# Patient Record
Sex: FEMALE | URBAN - METROPOLITAN AREA
[De-identification: names, ages, dates, MRNs, and addresses within clinical notes are randomized per-mention and may not be internally consistent; named-entity substitution may affect disease eponyms.]

---

## 2017-02-20 ENCOUNTER — ALLIED HEALTH/NURSE VISIT (OUTPATIENT)
Dept: FAMILY MEDICINE | Facility: OTHER | Age: 37
End: 2017-02-20
Payer: COMMERCIAL

## 2017-02-20 VITALS
DIASTOLIC BLOOD PRESSURE: 64 MMHG | RESPIRATION RATE: 18 BRPM | HEART RATE: 65 BPM | SYSTOLIC BLOOD PRESSURE: 101 MMHG | WEIGHT: 181 LBS | BODY MASS INDEX: 29.09 KG/M2 | HEIGHT: 66 IN

## 2017-02-20 DIAGNOSIS — Z32.01 PREGNANCY TEST PERFORMED, PREGNANCY CONFIRMED: ICD-10-CM

## 2017-02-20 DIAGNOSIS — Z32.00 PREGNANCY EXAMINATION OR TEST, PREGNANCY UNCONFIRMED: Primary | ICD-10-CM

## 2017-02-20 LAB
B-HCG SERPL-ACNC: NORMAL IU/L
BETA HCG QUAL IFA URINE: POSITIVE

## 2017-02-20 PROCEDURE — 84703 CHORIONIC GONADOTROPIN ASSAY: CPT | Performed by: FAMILY MEDICINE

## 2017-02-20 PROCEDURE — 99207 ZZC NO CHARGE NURSE ONLY: CPT

## 2017-02-20 PROCEDURE — 36415 COLL VENOUS BLD VENIPUNCTURE: CPT | Performed by: ADVANCED PRACTICE MIDWIFE

## 2017-02-20 PROCEDURE — 84702 CHORIONIC GONADOTROPIN TEST: CPT | Performed by: ADVANCED PRACTICE MIDWIFE

## 2017-02-20 RX ORDER — PRENATAL VIT/IRON FUM/FOLIC AC 27MG-0.8MG
1 TABLET ORAL DAILY
COMMUNITY

## 2017-02-20 ASSESSMENT — PAIN SCALES - GENERAL: PAINLEVEL: NO PAIN (0)

## 2017-02-20 NOTE — PROGRESS NOTES
Kassie Mayfield is a 37 year old here today for a pregnancy test.  LMP: Patient's last menstrual period was 2016.  Wt: 181 lbs 0 oz.    Symptoms include absence of menses, nausea &/or vomiting and fatigue.    Kassie informed of positive pregnancy test results. PEÑA: 2017    Educational advice given: nutrition, smoking, drugs & alcohol and medications.    Current medications reviewed: Yes    Previous pregnancy history remarkable for: abortions (2).    Plan: follow-up appointment with Noemi Elder  for pre-eryn care, take multivitamin or pre- vitamins and OB Education packet given.    She is to call back if she has any questions or concerns.  She is advised to notify a provider immediately if she experiences any severe cramping or abdominal pain or any vaginal bleeding.    ** Hcg Quant lab draw completed in the office today.  ** Patient has not decided on where she would like to deliver at this time. Discussed MG and PH packets, both packets given.

## 2017-02-20 NOTE — PATIENT INSTRUCTIONS
According to your last menstrual period, you are approximately 8 weeks and 2 days pregnant.  Your estimated due date is 09/30/2017.    To do list:  1. If you have not already started taking a prenatal vitamin, please start today.  2. The first OB visit with Noemi Guevara Jael is scheduled between 10 and 12 weeks: 03/21/2017 at 8am in [place].    To make these appointments, or if you have any questions and would like to speak to your care team, you can call the clinic's main phone number:       [facility]: [phone]       [facility]: [phone]       [facility]: [phone]       [facility]: [phone]       [facility]: [phone]    Please remember, we would like to hear from you if you have any vaginal bleeding or any moderate to severe abdominal pain/cramping. You can call any of the phone numbers above and speak with an RN promptly, even if it is after clinic hours, someone will answer your call.    Thank you for choosing [facility], and Congratulations!

## 2017-02-20 NOTE — MR AVS SNAPSHOT
After Visit Summary   2/20/2017    Kassie Mayfield    MRN: [id number]           Patient Information     Date Of Birth          1980        Visit Information        Provider Department      2/20/2017 8:30 AM NL RN TEAM A, KAREN [facility]        Today's Diagnoses     Pregnancy examination or test, pregnancy unconfirmed    -  1    Pregnancy test performed, pregnancy confirmed          Care Instructions    According to your last menstrual period, you are approximately 8 weeks and 2 days pregnant.  Your estimated due date is 09/30/2017.    To do list:  1. If you have not already started taking a prenatal vitamin, please start today.  2. The first OB visit with Noemikrishna Elder is scheduled between 10 and 12 weeks: 03/21/2017 at 8am in [place].    To make these appointments, or if you have any questions and would like to speak to your care team, you can call the [facility]'s main phone number:       [facility]: [phone]       [facility]: [phone]       [facility]: [phone]       [facility]: [phone]       [facility]: [phone]    Please remember, we would like to hear from you if you have any vaginal bleeding or any moderate to severe abdominal pain/cramping. You can call any of the phone numbers above and speak with an RN promptly, even if it is after clinic hours, someone will answer your call.    Thank you for choosing [facility], and Congratulations!              Follow-ups after your visit        Your next 10 appointments already scheduled     Mar 21, 2017  8:00 AM CDT   New Prenatal with RAJANI Meyers CNM   [facility]    [address] [facility] [id number] [phone]              Who to contact     If you have questions or need follow up information about today's clinic visit or your schedule please contact [facility] directly at [phone].  Normal or  "non-critical lab and imaging results will be communicated to you by MyChart, letter or phone within 4 business days after the clinic has received the results. If you do not hear from us within 7 days, please contact the clinic through WhoisEDIt or phone. If you have a critical or abnormal lab result, we will notify you by phone as soon as possible.  Submit refill requests through "[employer] or call your pharmacy and they will forward the refill request to us. Please allow 3 business days for your refill to be completed.          Additional Information About Your Visit        "[employer] Information     "[employer] lets you send messages to your doctor, view your test results, renew your prescriptions, schedule appointments and more. To sign up, go to [email or web address]/"[employer] . Click on \"Log in\" on the left side of the screen, which will take you to the Welcome page. Then click on \"Sign up Now\" on the right side of the page.     You will be asked to enter the access code listed below, as well as some personal information. Please follow the directions to create your username and password.     Your access code is: [email or web address]  Expires: 2017  9:29 AM     Your access code will  in 90 days. If you need help or a new code, please call your [place] clinic or [phone].        Care EveryWhere ID     This is your Care EveryWhere ID. This could be used by other organizations to access your [facility] medical records  [id number]        Your Vitals Were     Pulse Respirations Height Last Period Breastfeeding? BMI (Body Mass Index)    65 18 5' 5.51\" (1.664 m) 2016 No 29.65 kg/m2       Blood Pressure from Last 3 Encounters:   17 101/64    Weight from Last 3 Encounters:   17 181 lb (82.1 kg)              We Performed the Following     Beta HCG qual IFA urine     HCG, quantitative, pregnancy        Primary Care Provider    None Specified       No primary provider on file.        Thank you!     Thank you for " choosing [facility]  for your care. Our goal is always to provide you with excellent care. Hearing back from our patients is one way we can continue to improve our services. Please take a few minutes to complete the written survey that you may receive in the mail after your visit with us. Thank you!             Your Updated Medication List - Protect others around you: Learn how to safely use, store and throw away your medicines at www.disposemymeds.org.          This list is accurate as of: 2/20/17  9:29 AM.  Always use your most recent med list.                   Brand Name Dispense Instructions for use    prenatal multivitamin  plus iron 27-0.8 MG Tabs per tablet      Take 1 tablet by mouth daily

## 2017-03-01 ENCOUNTER — PRENATAL OFFICE VISIT (OUTPATIENT)
Dept: OBGYN | Facility: OTHER | Age: 37
End: 2017-03-01
Payer: COMMERCIAL

## 2017-03-01 VITALS
WEIGHT: 179.25 LBS | SYSTOLIC BLOOD PRESSURE: 100 MMHG | DIASTOLIC BLOOD PRESSURE: 52 MMHG | HEART RATE: 72 BPM | HEIGHT: 65 IN | BODY MASS INDEX: 29.87 KG/M2

## 2017-03-01 DIAGNOSIS — D57.3 SICKLE CELL TRAIT (H): ICD-10-CM

## 2017-03-01 DIAGNOSIS — Z23 NEED FOR TDAP VACCINATION: ICD-10-CM

## 2017-03-01 DIAGNOSIS — O09.529 SUPERVISION OF HIGH-RISK PREGNANCY OF ELDERLY MULTIGRAVIDA: Primary | ICD-10-CM

## 2017-03-01 LAB
ABO + RH BLD: NORMAL
ABO + RH BLD: NORMAL
ALBUMIN UR-MCNC: NEGATIVE MG/DL
APPEARANCE UR: CLEAR
BILIRUB UR QL STRIP: NEGATIVE
BLD GP AB SCN SERPL QL: NORMAL
BLOOD BANK CMNT PATIENT-IMP: NORMAL
COLOR UR AUTO: YELLOW
ERYTHROCYTE [DISTWIDTH] IN BLOOD BY AUTOMATED COUNT: 15 % (ref 10–15)
GLUCOSE UR STRIP-MCNC: NEGATIVE MG/DL
HCT VFR BLD AUTO: 34.2 % (ref 35–47)
HGB BLD-MCNC: 12.2 G/DL (ref 11.7–15.7)
HGB UR QL STRIP: NEGATIVE
KETONES UR STRIP-MCNC: NEGATIVE MG/DL
LEUKOCYTE ESTERASE UR QL STRIP: NEGATIVE
MCH RBC QN AUTO: 26.5 PG (ref 26.5–33)
MCHC RBC AUTO-ENTMCNC: 35.7 G/DL (ref 31.5–36.5)
MCV RBC AUTO: 74 FL (ref 78–100)
NITRATE UR QL: NEGATIVE
PH UR STRIP: 5.5 PH (ref 5–7)
PLATELET # BLD AUTO: 160 10E9/L (ref 150–450)
RBC # BLD AUTO: 4.61 10E12/L (ref 3.8–5.2)
SP GR UR STRIP: 1.02 (ref 1–1.03)
SPECIMEN EXP DATE BLD: NORMAL
URN SPEC COLLECT METH UR: NORMAL
UROBILINOGEN UR STRIP-ACNC: 0.2 EU/DL (ref 0.2–1)
WBC # BLD AUTO: 7.8 10E9/L (ref 4–11)

## 2017-03-01 PROCEDURE — 85027 COMPLETE CBC AUTOMATED: CPT | Performed by: ADVANCED PRACTICE MIDWIFE

## 2017-03-01 PROCEDURE — 87591 N.GONORRHOEAE DNA AMP PROB: CPT | Performed by: ADVANCED PRACTICE MIDWIFE

## 2017-03-01 PROCEDURE — 86762 RUBELLA ANTIBODY: CPT | Performed by: ADVANCED PRACTICE MIDWIFE

## 2017-03-01 PROCEDURE — 87389 HIV-1 AG W/HIV-1&-2 AB AG IA: CPT | Performed by: ADVANCED PRACTICE MIDWIFE

## 2017-03-01 PROCEDURE — 81003 URINALYSIS AUTO W/O SCOPE: CPT | Performed by: ADVANCED PRACTICE MIDWIFE

## 2017-03-01 PROCEDURE — 36415 COLL VENOUS BLD VENIPUNCTURE: CPT | Performed by: ADVANCED PRACTICE MIDWIFE

## 2017-03-01 PROCEDURE — 99207 ZZC FIRST OB VISIT: CPT | Performed by: ADVANCED PRACTICE MIDWIFE

## 2017-03-01 PROCEDURE — 86900 BLOOD TYPING SEROLOGIC ABO: CPT | Performed by: ADVANCED PRACTICE MIDWIFE

## 2017-03-01 PROCEDURE — 87491 CHLMYD TRACH DNA AMP PROBE: CPT | Performed by: ADVANCED PRACTICE MIDWIFE

## 2017-03-01 PROCEDURE — 87086 URINE CULTURE/COLONY COUNT: CPT | Performed by: ADVANCED PRACTICE MIDWIFE

## 2017-03-01 PROCEDURE — 86901 BLOOD TYPING SEROLOGIC RH(D): CPT | Performed by: ADVANCED PRACTICE MIDWIFE

## 2017-03-01 PROCEDURE — 87340 HEPATITIS B SURFACE AG IA: CPT | Performed by: ADVANCED PRACTICE MIDWIFE

## 2017-03-01 PROCEDURE — 86780 TREPONEMA PALLIDUM: CPT | Performed by: ADVANCED PRACTICE MIDWIFE

## 2017-03-01 PROCEDURE — 86850 RBC ANTIBODY SCREEN: CPT | Performed by: ADVANCED PRACTICE MIDWIFE

## 2017-03-01 NOTE — PROGRESS NOTES
Kassie Mayfield is a 37 year old  who presents to the clinic for an new ob visit.   Estimated Date of Delivery: Sep 30, 2017 is calculated from Patient's last menstrual period was 2016.       She has not had bleeding since her LMP.   She has had mild nausea. Weigh loss has not occurred.     HISTORY  updated and reviewed  Past Medical History   Diagnosis Date     NO ACTIVE PROBLEMS      Past Surgical History   Procedure Laterality Date     Appendectomy       Social History     Social History     Marital status:      Spouse name: N/A     Number of children: N/A     Years of education: N/A     Occupational History     Not on file.     Social History Main Topics     Smoking status: Never Smoker     Smokeless tobacco: Not on file     Alcohol use No     Drug use: No     Sexual activity: Yes     Birth control/ protection: None     Other Topics Concern     Not on file     Social History Narrative     Health Maintenance   Topic Date Due     TETANUS IMMUNIZATION (SYSTEM ASSIGNED)  1997     PAP SCREENING Q3 YR (SYSTEM ASSIGNED)  2000     INFLUENZA VACCINE (SYSTEM ASSIGNED)  2017     Family History   Problem Relation Age of Onset     Hypertension Father      DIABETES No family hx of      CEREBROVASCULAR DISEASE No family hx of      Breast Cancer No family hx of      Colon Cancer No family hx of            REVIEW OF SYSTEMS  C: NEGATIVE for fever, chills  I: NEGATIVE for worrisome rashes, moles or lesions  E: NEGATIVE for vision changes   E/M: NEGATIVE for ear, mouth and throat problems  R: NEGATIVE for significant cough or SOB  B: NEGATIVE for masses, tenderness or discharge  CV: NEGATIVE for chest pain, palpitations   GI: NEGATIVE for nausea, abdominal pain, heartburn, or change in bowel habits  : NEGATIVE for frequency, dysuria, or hematuria  M: NEGATIVE for significant arthralgias or myalgia  N: NEGATIVE for weakness, dizziness or paresthesias or headache  E: NEGATIVE for temperature  "intolerance, skin/hair changes  H: NEGATIVE for bleeding problems  P: NEGATIVE for changes in mood or affect        PHYSICAL EXAM  /52 (BP Location: Right arm, Patient Position: Chair, Cuff Size: Adult Large)  Pulse 72  Ht 5' 4.5\" (1.638 m)  Wt 179 lb 4 oz (81.3 kg)  LMP 2016  BMI 30.29 kg/m2  GENERAL:  Pleasant pregnant female, alert, cooperative  and well groomed.  SKIN:  Warm and dry, without lesions or rashes  HEAD: Symmetrical features.  EYES:  PERRLA.  EARS: Tympanic membranes gray, translucent and intact.  MOUTH:  Buccal mucosa pink, moist without lesions.  Teeth in good repair.    NECK:  Thyroid without enlargement and nodules.  Lymph nodes not palpable.   LUNGS:  Clear to auscultation.  BREAST:  Symmetrical.  No dominant, fixed or suspicious masses are noted.  No skin or nipple changes or axillary nodes.    Nipples everted.      HEART:  RRR with  out murmur.  ABDOMEN: Soft without masses , tenderness or organomegaly.  No CVA tenderness.  Uterus palpable at size greater than dates.    MUSCULOSKELETAL:  Full range of motion  GENITALIA: EGBUS normal. Vulva reveals no erythema or lesions.       VAGINA:  pink, normal ruga and discharge, no lesions.        EXTREMITIES:  No edema. No significant varicosities.  Bed side ultrasound at 11 w 1 d    ASSESSMENT:  Intrauterine pregnancy of 9w4d  (O09.529) Supervision of high-risk pregnancy of elderly multigravida  (primary encounter diagnosis)  Comment:   Plan: UA without Microscopic, Urine Culture Aerobic         Bacterial, CBC with platelets, ABO/Rh type and         screen, HIV Antigen Antibody Combo, Rubella         Antibody IgG Quantitative, Hepatitis B surface         antigen, Anti Treponema, Chlamydia trachomatis         PCR, Neisseria gonorrhoeae PCR, US OB < 14         Weeks Single              PLAN:  Options for  testing for chromosomal and birth defects were discussed with the patient. Diagnostic tests include CVS and Amniocentesis. We " discussed that these tests are definitive but invasive and do carry a risk of fetal loss.   Screening tests include nuchal translucency/blood marker testing in the first trimester and quad screening in the second trimester. We discussed that these are screening tests and not diagnostic tests and that false positives and negatives are a distinct possibility.  Declines  testing.    Instructed on best evidence for: weight gain for her weight and height for pregnancy; healthy diet and foods to avoid; exercise and activity during pregnancy;avoiding exposure to toxoplasmosis; and maintenance of a generally healthy lifestyle.     Intended hospital for birth is [facility].  Reviewed transmission of and avoidance strategies for CMV.    Discussed travel cautions.    She had her parnter  have   not traveled to areas currently infected with zika in the past 6 months and currently have  no plans to travel to an area infected with Zika.   If travel plans change they will inform us.        Genetic Screening  At the time of birth, will you be 35 years old or older?  Yes:   Has the patient, baby s father, or anyone in either family had:  Thalassemia (Italian, Greek, Mediterranean, or  background only) and an MCV result less than 80?  No  Neural tube defect such as meningomyelocele, spina bifida or anencephaly? No  Congenital heart defect? No  Down s syndrome?  No  Nic-Sach s disease (Mandaen, Cajun, Cymro-Granbury)? No  Sickle cell disease or trait (Ruth)?  No  Muscular dystrophy?  No  Cystic Fibrosis?  No  Thalia s chorea? No  Mental retardation/autism?  No   If yes, was the person tested for fragile X?  No  Any other inherited genetic or chromosomal disorder? No  Maternal metabolic disorder (e.g. insulin-dependent diabetes, PKU)?  No  A child with birth defects not listed above? No  Recurrent pregnancy loss or a stillbirth?  No  Does the patient or baby s father have any other genetic risks? No  Infection History  Do  we have your permission to complete routine prenatal lab tests.  This includes Hepatitis B, HIV? Yes:   Do you feel that you are at high risk for coming in contact with the AIDS virus? No  Have you ever been treated for tuberculosis?  No  Have you ever received the BCG vaccine for tuberculosis? No  Do you live with someone who has tuberculosis? No   Have you ever been exposed to tuberculosis?  No  Do you have genital herpes?  No  Does your partner have genital herpes?  No  Have you had a rash or viral illness since your last period?  No  Have you ever had Gonorrhea, Chlamydia, Syphilis, venereal warts, trichomoniasis, pelvic inflammatory disease or any other sexually transmitted disease?  No  Have you had chicken pox?  Yes:   Have you been vaccinated against chicken pox?  No  Have you had any other infectious disease?  No

## 2017-03-01 NOTE — NURSING NOTE
"Chief Complaint   Patient presents with     Prenatal Care       Initial /52 (BP Location: Right arm, Patient Position: Chair, Cuff Size: Adult Large)  Pulse 72  Ht 5' 4.5\" (1.638 m)  Wt 179 lb 4 oz (81.3 kg)  LMP 12/24/2016  BMI 30.29 kg/m2 Estimated body mass index is 30.29 kg/(m^2) as calculated from the following:    Height as of this encounter: 5' 4.5\" (1.638 m).    Weight as of this encounter: 179 lb 4 oz (81.3 kg).  Medication Reconciliation: complete   Kayla Oneal CMA      "

## 2017-03-01 NOTE — PATIENT INSTRUCTIONS
Thank for choosing our clinic for your health care needs. We aim to provided you with excellent care. One way that we continue to improve our care is by listening to our patients. Please take a few minutes to complete the written survey that you may receive in the mail after your visit.     You may reach your care team by calling the following numbers:    [place]- [phone]   For clinic hours at [facility]  please visit the [facility] web site [email or web address]    Notification of your lab results:  Normal or non-critical lab and imaging results will be communicated to you by Mychart, letter, or phone within 7 days. If you do not hear from us within 10 days, please contact us through Mychart or phone. If you have a critical or abnormal lab result, we will notify you by phone as soon as possible.      After Hours nurse advice:  [facility] Advisors:  [phone]     Medication Refills:  Please contact your pharmacy and they will forward the refill to us. Please allow 3 business days for your refills to be completed.     Secure access to your medical record:  Use Innolight (secure email communication and access to your chart) to send your primary care provider a message or make an appointment. Ask someone on your Team how to sign up for Innolight. To log on to Validic or for more information in Innolight please visit the website at [email or web address].      OBGYN Care Team               Janis Keil Day [facility]   Clinic hours 8-5 Tuesday and Wednesday Thursdays 8 AM to 7PM    Diamond Hill DO  Clinic hours 7-6 Monday at [place]  8-5 Tuesdays [facility]  7-12 Fridays [facility]  1-5 Fridays at [place]    Jeannette Uriarte MD  Clinic hours: [place] Monday and Thursday 8:15-5  [place] Tuesday 12-5 and Friday 8-5

## 2017-03-01 NOTE — MR AVS SNAPSHOT
After Visit Summary   3/1/2017    Kassie Mayfield    MRN: [id number]           Patient Information     Date Of Birth          1980        Visit Information        Provider Department      3/1/2017 8:00 AM Noemi Buitrago APRN CNM [facility]        Today's Diagnoses     Supervision of high-risk pregnancy of elderly multigravida    -  1      Care Instructions    Thank for choosing our clinic for your health care needs. We aim to provided you with excellent care. One way that we continue to improve our care is by listening to our patients. Please take a few minutes to complete the written survey that you may receive in the mail after your visit.     You may reach your care team by calling the following numbers:    [place]- [phone]   For clinic hours at [facility]  please visit the [facility] web site [email or web address]    Notification of your lab results:  Normal or non-critical lab and imaging results will be communicated to you by Mychart, letter, or phone within 7 days. If you do not hear from us within 10 days, please contact us through Cellwitchhart or phone. If you have a critical or abnormal lab result, we will notify you by phone as soon as possible.      After Hours nurse advice:  [facility] Nurse Advisors:  [phone]     Medication Refills:  Please contact your pharmacy and they will forward the refill to us. Please allow 3 business days for your refills to be completed.     Secure access to your medical record:  Use Cellwitchhart (secure email communication and access to your chart) to send your primary care provider a message or make an appointment. Ask someone on your Team how to sign up for Landingit. To log on to Labtiva or for more information in GTxcel please visit the website at [email or web address].      OBGYN Care Team               Noemi Elder CNM   Clinic hours 8-5 Tuesday and Wednesday Thursdays 8 AM to 7PM    Diamond Hill DO  Clinic hours 7-6  "Monday at [place]  8-5  at [place]  7-12  [facility]  1-5  at [place]    Jeannette Uriarte MD  Clinic hours: [place] Monday and Thursday 8:15-5  [place] Tuesday 12-5 and Friday 8-5                                  Follow-ups after your visit        Future tests that were ordered for you today     Open Future Orders        Priority Expected Expires Ordered    US OB < 14 Weeks Single Routine  2017 3/1/2017            Who to contact     If you have questions or need follow up information about today's clinic visit or your schedule please contact [facility] directly at [phone].  Normal or non-critical lab and imaging results will be communicated to you by MyChart, letter or phone within 4 business days after the clinic has received the results. If you do not hear from us within 7 days, please contact the clinic through Celltick Technologieshart or phone. If you have a critical or abnormal lab result, we will notify you by phone as soon as possible.  Submit refill requests through BayRu or call your pharmacy and they will forward the refill request to us. Please allow 3 business days for your refill to be completed.          Additional Information About Your Visit        [employer] Information     BayRu lets you send messages to your doctor, view your test results, renew your prescriptions, schedule appointments and more. To sign up, go to [email or web address] . Click on \"Log in\" on the left side of the screen, which will take you to the Welcome page. Then click on \"Sign up Now\" on the right side of the page.     You will be asked to enter the access code listed below, as well as some personal information. Please follow the directions to create your username and password.     Your access code is: [email or web address]  Expires: 2017  9:29 AM     Your access code will  in 90 days. If you need help or a new code, please call your [facility] or [phone].        Care " "EveryWhere ID     This is your Care EveryWhere ID. This could be used by other organizations to access your [facility] medical records  [id number]        Your Vitals Were     Pulse Height Last Period BMI (Body Mass Index)          72 5' 4.5\" (1.638 m) 12/24/2016 30.29 kg/m2         Blood Pressure from Last 3 Encounters:   03/01/17 100/52   02/20/17 101/64    Weight from Last 3 Encounters:   03/01/17 179 lb 4 oz (81.3 kg)   02/20/17 181 lb (82.1 kg)              We Performed the Following     ABO/Rh type and screen     Anti Treponema     CBC with platelets     Chlamydia trachomatis PCR     Hepatitis B surface antigen     HIV Antigen Antibody Combo     Neisseria gonorrhoeae PCR     Rubella Antibody IgG Quantitative     UA without Microscopic     Urine Culture Aerobic Bacterial        Primary Care Provider    None Specified       No primary provider on file.        Thank you!     Thank you for choosing [facility]  for your care. Our goal is always to provide you with excellent care. Hearing back from our patients is one way we can continue to improve our services. Please take a few minutes to complete the written survey that you may receive in the mail after your visit with us. Thank you!             Your Updated Medication List - Protect others around you: Learn how to safely use, store and throw away your medicines at www.disposemymeds.org.          This list is accurate as of: 3/1/17  8:58 AM.  Always use your most recent med list.                   Brand Name Dispense Instructions for use    prenatal multivitamin  plus iron 27-0.8 MG Tabs per tablet      Take 1 tablet by mouth daily         "

## 2017-03-02 LAB
C TRACH DNA SPEC QL NAA+PROBE: NORMAL
HBV SURFACE AG SERPL QL IA: NONREACTIVE
HIV 1+2 AB+HIV1 P24 AG SERPL QL IA: NORMAL
N GONORRHOEA DNA SPEC QL NAA+PROBE: NORMAL
RUBV IGG SERPL IA-ACNC: 54 IU/ML
SPECIMEN SOURCE: NORMAL
SPECIMEN SOURCE: NORMAL
T PALLIDUM IGG+IGM SER QL: NEGATIVE

## 2017-03-02 ASSESSMENT — PATIENT HEALTH QUESTIONNAIRE - PHQ9: SUM OF ALL RESPONSES TO PHQ QUESTIONS 1-9: 2

## 2017-03-03 LAB
BACTERIA SPEC CULT: NORMAL
MICRO REPORT STATUS: NORMAL
SPECIMEN SOURCE: NORMAL

## 2017-03-08 ENCOUNTER — RADIANT APPOINTMENT (OUTPATIENT)
Dept: ULTRASOUND IMAGING | Facility: OTHER | Age: 37
End: 2017-03-08
Attending: ADVANCED PRACTICE MIDWIFE
Payer: COMMERCIAL

## 2017-03-08 DIAGNOSIS — O09.529 SUPERVISION OF HIGH-RISK PREGNANCY OF ELDERLY MULTIGRAVIDA: ICD-10-CM

## 2017-03-08 PROCEDURE — 76801 OB US < 14 WKS SINGLE FETUS: CPT

## 2017-03-09 PROBLEM — O44.00 PLACENTA PREVIA WITHOUT HEMORRHAGE, ANTEPARTUM: Status: ACTIVE | Noted: 2017-03-09

## 2017-03-29 ENCOUNTER — PRENATAL OFFICE VISIT (OUTPATIENT)
Dept: OBGYN | Facility: OTHER | Age: 37
End: 2017-03-29
Payer: COMMERCIAL

## 2017-03-29 VITALS
DIASTOLIC BLOOD PRESSURE: 52 MMHG | BODY MASS INDEX: 31.05 KG/M2 | HEART RATE: 68 BPM | SYSTOLIC BLOOD PRESSURE: 100 MMHG | WEIGHT: 183.75 LBS

## 2017-03-29 DIAGNOSIS — O09.529 SUPERVISION OF HIGH-RISK PREGNANCY OF ELDERLY MULTIGRAVIDA: Primary | ICD-10-CM

## 2017-03-29 PROBLEM — Z23 NEED FOR TDAP VACCINATION: Status: ACTIVE | Noted: 2017-03-29

## 2017-03-29 PROCEDURE — 99207 ZZC PRENATAL VISIT: CPT | Performed by: ADVANCED PRACTICE MIDWIFE

## 2017-03-29 NOTE — PATIENT INSTRUCTIONS
Thank for choosing our clinic for your health care needs. We aim to provided you with excellent care. One way that we continue to improve our care is by listening to our patients. Please take a few minutes to complete the written survey that you may receive in the mail after your visit.     You may reach your care team by calling the following numbers:    [place]- [phone]   For clinic hours at [facility]  please visit the [facility] web site [email or web address]    Notification of your lab results:  Normal or non-critical lab and imaging results will be communicated to you by Mychart, letter, or phone within 7 days. If you do not hear from us within 10 days, please contact us through Mychart or phone. If you have a critical or abnormal lab result, we will notify you by phone as soon as possible.      After Hours nurse advice:  [facility] Nurse Advisors:  [phone]     Medication Refills:  Please contact your pharmacy and they will forward the refill to us. Please allow 3 business days for your refills to be completed.     Secure access to your medical record:  Use Hometapper (secure email communication and access to your chart) to send your primary care provider a message or make an appointment. Ask someone on your Team how to sign up for Hometapper. To log on to Jaypore or for more information in Hometapper please visit the website at [email or web address].      OBGYN Care Team               Janis Keil Day [facility]   Clinic hours 8-5 Tuesday and Wednesday Thursdays 8 AM to 7PM    Diamond Hill DO  Clinic hours 7-6 Monday at [place]  8-5 Tuesdays [facility]  7-12 Fridays [facility]  1-5 Fridays at [place]    Jeannette Uriarte MD  Clinic hours: [place] Monday and Thursday 8:15-5  [place] Tuesday 12-5 and Friday 8-5

## 2017-03-29 NOTE — PROGRESS NOTES
Feeling well.  Other than some back pain after mopping.   No bleeding.   Will plan MFM scan next visit.   Declines quad.   No constipation, some continued nausea.   RTC 4 weeks.   RAJANI Garcia, PRAAKSHM

## 2017-03-29 NOTE — NURSING NOTE
"Chief Complaint   Patient presents with     Prenatal Care     low back pain when laying down       Initial /52 (BP Location: Right arm, Patient Position: Chair, Cuff Size: Adult Large)  Pulse 68  Wt 183 lb 12 oz (83.3 kg)  LMP 12/24/2016  BMI 31.05 kg/m2 Estimated body mass index is 31.05 kg/(m^2) as calculated from the following:    Height as of 3/1/17: 5' 4.5\" (1.638 m).    Weight as of this encounter: 183 lb 12 oz (83.3 kg).  Medication Reconciliation: complete   Kayla Oneal CMA      "

## 2017-03-29 NOTE — MR AVS SNAPSHOT
After Visit Summary   3/29/2017    Kassie Mayfield    MRN: [id number]           Patient Information     Date Of Birth          1980        Visit Information        Provider Department      3/29/2017 8:00 AM Noemi Buitrago APRN CNM [facility]        Today's Diagnoses     Supervision of high-risk pregnancy of elderly multigravida    -  1      Care Instructions    Thank for choosing our clinic for your health care needs. We aim to provided you with excellent care. One way that we continue to improve our care is by listening to our patients. Please take a few minutes to complete the written survey that you may receive in the mail after your visit.     You may reach your care team by calling the following numbers:    [place]- [phone]   For clinic hours at [facility]  please visit the [facility] web site [email or web address]    Notification of your lab results:  Normal or non-critical lab and imaging results will be communicated to you by Mychart, letter, or phone within 7 days. If you do not hear from us within 10 days, please contact us through [email or web address] or phone. If you have a critical or abnormal lab result, we will notify you by phone as soon as possible.      After Hours nurse advice:  [facility]:  [phone]     Medication Refills:  Please contact your pharmacy and they will forward the refill to us. Please allow 3 business days for your refills to be completed.     Secure access to your medical record:  Use Peap.[email or web address] (secure email communication and access to your chart) to send your primary care provider a message or make an appointment. Ask someone on your Team how to sign up for PlayMotiont. To log on to MyBeautyCompare or for more information in "[employer] please visit the website at [email or web address].      OBGYN Care Team               Noemi Elder CNM   Clinic hours 8-5 Tuesday and Wednesday Thursdays 8 AM to 7PM    Diamond Hill DO  Clinic hours  7-6 Monday at [place]  8-5 Tuesdays at [place]  7-12 Fridays [facility]  1-5 Fridays at [place]    Jeannette Uriarte MD  Clinic hours: [place] Monday and Thursday 8:15-5  [place] Tuesday 12-5 and Friday 8-5                                  Follow-ups after your visit        Additional Services     PERINATOLOGY REFERRAL       Your provider has referred you to Perinatology Services (please use [facility] Referral if referring to [facility]).    Please be aware that coverage of these services is subject to the terms and limitations of your health insurance plan.  Call member services at your health plan with any benefit or coverage questions.      Please bring the following with you to your appointment:    (1) Any X-Rays, CTs or MRIs which have been performed.  Contact the facility where they were done to arrange for  prior to your scheduled appointment.  Any new CT, MRI or other procedures ordered by your specialist must be performed at a [place] facility or coordinated by your clinic's referral office.    (2) List of current medications   (3) This referral request   (4) Any documents/labs given to you for this referral                  Follow-up notes from your care team     Return in about 1 month (around 4/29/2017).      Who to contact     If you have questions or need follow up information about today's clinic visit or your schedule please contact [facility] directly at [phone].  Normal or non-critical lab and imaging results will be communicated to you by MyChart, letter or phone within 4 business days after the clinic has received the results. If you do not hear from us within 7 days, please contact the clinic through MyChart or phone. If you have a critical or abnormal lab result, we will notify you by phone as soon as possible.  Submit refill requests through [employer] or call your pharmacy and they will forward the refill request to us. Please  "allow 3 business days for your refill to be completed.          Additional Information About Your Visit        MyChart Information     Preclickhart lets you send messages to your doctor, view your test results, renew your prescriptions, schedule appointments and more. To sign up, go to [email or web address] . Click on \"Log in\" on the left side of the screen, which will take you to the Welcome page. Then click on \"Sign up Now\" on the right side of the page.     You will be asked to enter the access code listed below, as well as some personal information. Please follow the directions to create your username and password.     Your access code is: [email or web address]  Expires: 2017 10:29 AM     Your access code will  in 90 days. If you need help or a new code, please call your [place] clinic or [phone].        Care EveryWhere ID     This is your Care EveryWhere ID. This could be used by other organizations to access your [place] medical records  [id number]        Your Vitals Were     Pulse Last Period BMI (Body Mass Index)             68 2016 31.05 kg/m2          Blood Pressure from Last 3 Encounters:   17 100/52   17 100/52   17 101/64    Weight from Last 3 Encounters:   17 183 lb 12 oz (83.3 kg)   17 179 lb 4 oz (81.3 kg)   17 181 lb (82.1 kg)              We Performed the Following     PERINATOLOGY REFERRAL        Primary Care Provider    None       No address on file        Thank you!     Thank you for choosing [facility]  for your care. Our goal is always to provide you with excellent care. Hearing back from our patients is one way we can continue to improve our services. Please take a few minutes to complete the written survey that you may receive in the mail after your visit with us. Thank you!             Your Updated Medication List - Protect others around you: Learn how to safely use, store and throw away your medicines at " www.disposemymeds.org.          This list is accurate as of: 3/29/17  8:33 AM.  Always use your most recent med list.                   Brand Name Dispense Instructions for use    prenatal multivitamin  plus iron 27-0.8 MG Tabs per tablet      Take 1 tablet by mouth daily

## 2017-04-20 ENCOUNTER — MEDICAL CORRESPONDENCE (OUTPATIENT)
Dept: HEALTH INFORMATION MANAGEMENT | Facility: CLINIC | Age: 37
End: 2017-04-20

## 2017-04-21 ENCOUNTER — TELEPHONE (OUTPATIENT)
Dept: OBGYN | Facility: OTHER | Age: 37
End: 2017-04-21

## 2017-04-21 NOTE — TELEPHONE ENCOUNTER
MFM referral refaxed to [place]. Spoke with patient and let her know they will be contacting her in the next several days to set up appointment.  Kayla Oneal CMA

## 2017-04-21 NOTE — TELEPHONE ENCOUNTER
Patient states Noemi Elder told the patient she wants her to have her 20 week ultra sound done at [place] and the patient thought that someone was going to set this up for her but she hasn't heard nothing yet. Please call and let the patient know if she needs to call someone.    Thank you Cathie

## 2017-05-05 ENCOUNTER — TRANSFERRED RECORDS (OUTPATIENT)
Dept: HEALTH INFORMATION MANAGEMENT | Facility: CLINIC | Age: 37
End: 2017-05-05

## 2017-05-23 ENCOUNTER — PRENATAL OFFICE VISIT (OUTPATIENT)
Dept: OBGYN | Facility: OTHER | Age: 37
End: 2017-05-23
Payer: COMMERCIAL

## 2017-05-23 VITALS
BODY MASS INDEX: 31.52 KG/M2 | WEIGHT: 186.5 LBS | SYSTOLIC BLOOD PRESSURE: 108 MMHG | DIASTOLIC BLOOD PRESSURE: 50 MMHG | HEART RATE: 64 BPM

## 2017-05-23 DIAGNOSIS — O09.529 SUPERVISION OF HIGH-RISK PREGNANCY OF ELDERLY MULTIGRAVIDA: Primary | ICD-10-CM

## 2017-05-23 PROBLEM — O44.00 PLACENTA PREVIA WITHOUT HEMORRHAGE, ANTEPARTUM: Status: RESOLVED | Noted: 2017-03-09 | Resolved: 2017-05-23

## 2017-05-23 PROCEDURE — 99207 ZZC PRENATAL VISIT: CPT | Performed by: ADVANCED PRACTICE MIDWIFE

## 2017-05-23 NOTE — MR AVS SNAPSHOT
After Visit Summary   2017    Kassie Mayfield    MRN: [id number]           Patient Information     Date Of Birth          1980        Visit Information        Provider Department      2017 8:00 AM Noemi Buitrago APRN CNM [facility]        Care Instructions    GESTATIONAL DIABETES SCREENING    All pregnant women are screened at least once for diabetes as part of their prenatal care.  A woman has gestational diabetes if she has high blood sugars for the first time during pregnancy.  Diabetes can harm your health and the health of your baby.  But if we find the diabetes early in pregnancy and we watch your health closely, we can prevent problems.   We will check for diabetes between your 24 and 28 week visit.   Please note you can not do this prior to 24 weeks of gestation.    Please schedule this test between 8 AM and 11 AM or 1 PM and 3:45 PM.  On Monday and Thursday you may schedule your appointment up to 5:45PM in [place] and on Monday's in [place] until 5:45 PM    Plan to spend an hour at the clinic.  When you check in let us know that you will be having your diabetes screening that day.   We will give you a sweet drink and one hour later will draw blood from your arm to check your blood sugar.   We will call you to let you know if your results are normal.  If the results are normal no more testing will be needed.  If your results are not normal we will discuss follow up testing with you.    You may eat prior to the testing but it is not recommended to eat or drink very sweet things such as pop, juice, candy or dessert type foods.   If you have any questions please call:  [place] [phone]      SIGNS OF  LABOR    Labor is  if it happens more than three weeks before your due date.    It can be hard to know if you are in labor, since the symptoms can be like the normal feelings of pregnancy.  Often, the only difference is the symptoms increase or  they don't go away.     Signs of  labor can include:    Change in your vaginal discharge:  You will have more vaginal discharge when you are pregnant and it should be creamy white.  Call the clinic right away if your discharge has a foul odor, pink or bloody,  or if it becomes watery or is more than is normal for you during your pregnancy.    More than 5-6 contractions or tightenings per hour.  Contractions can feel like period cramps or bowel (gas or diarrhea) pain.  You will feel it in the lower part of your abdomen, in your back or as a pressure feeling in your bottom.  It is often regular, coming for 30 seconds or a minute and then going away, only to come back 5 or 10 minutes later. Some contractions are normal during pregnancy, but if you are feeling more than 5-6 in one hour, empty your bladder, then drink 16-24 ounces of water, eat a snack and lay down on your left side. Put your hand on your abdomen to count the contractions.  If after one hour of resting you have still had 5-6 contractions call your clinic.    ;          Follow-ups after your visit        Who to contact     If you have questions or need follow up information about today's clinic visit or your schedule please contact [facility] directly at [phone].  Normal or non-critical lab and imaging results will be communicated to you by Tiny Lab Productionshart, letter or phone within 4 business days after the clinic has received the results. If you do not hear from us within 7 days, please contact the clinic through Bioseriet or phone. If you have a critical or abnormal lab result, we will notify you by phone as soon as possible.  Submit refill requests through Splash.FM or call your pharmacy and they will forward the refill request to us. Please allow 3 business days for your refill to be completed.          Additional Information About Your Visit        Splash.FM Information     Splash.FM lets you send messages to your doctor, view your test  "results, renew your prescriptions, schedule appointments and more. To sign up, go to [email or web address] . Click on \"Log in\" on the left side of the screen, which will take you to the Welcome page. Then click on \"Sign up Now\" on the right side of the page.     You will be asked to enter the access code listed below, as well as some personal information. Please follow the directions to create your username and password.     Your access code is: [email or web address]  Expires: 2017  8:09 AM     Your access code will  in 90 days. If you need help or a new code, please call your [place] clinic or [phone].        Care EveryWhere ID     This is your Care EveryWhere ID. This could be used by other organizations to access your [place] medical records  [id number]        Your Vitals Were     Pulse Last Period BMI (Body Mass Index)             64 2016 31.52 kg/m2          Blood Pressure from Last 3 Encounters:   17 108/50   17 100/52   17 100/52    Weight from Last 3 Encounters:   17 186 lb 8 oz (84.6 kg)   17 183 lb 12 oz (83.3 kg)   17 179 lb 4 oz (81.3 kg)              Today, you had the following     No orders found for display       Primary Care Provider    None       No address on file        Thank you!     Thank you for choosing [facility]  for your care. Our goal is always to provide you with excellent care. Hearing back from our patients is one way we can continue to improve our services. Please take a few minutes to complete the written survey that you may receive in the mail after your visit with us. Thank you!             Your Updated Medication List - Protect others around you: Learn how to safely use, store and throw away your medicines at www.disposemymeds.org.          This list is accurate as of: 17  8:13 AM.  Always use your most recent med list.                   Brand Name Dispense Instructions for use    prenatal multivitamin  " plus iron 27-0.8 MG Tabs per tablet      Take 1 tablet by mouth daily

## 2017-05-23 NOTE — PATIENT INSTRUCTIONS
GESTATIONAL DIABETES SCREENING    All pregnant women are screened at least once for diabetes as part of their prenatal care.  A woman has gestational diabetes if she has high blood sugars for the first time during pregnancy.  Diabetes can harm your health and the health of your baby.  But if we find the diabetes early in pregnancy and we watch your health closely, we can prevent problems.   We will check for diabetes between your 24 and 28 week visit.   Please note you can not do this prior to 24 weeks of gestation.    Please schedule this test between 8 AM and 11 AM or 1 PM and 3:45 PM.  On Monday and Thursday you may schedule your appointment up to 5:45PM in [place] and on Monday's in [place] until 5:45 PM    Plan to spend an hour at the clinic.  When you check in let us know that you will be having your diabetes screening that day.   We will give you a sweet drink and one hour later will draw blood from your arm to check your blood sugar.   We will call you to let you know if your results are normal.  If the results are normal no more testing will be needed.  If your results are not normal we will discuss follow up testing with you.    You may eat prior to the testing but it is not recommended to eat or drink very sweet things such as pop, juice, candy or dessert type foods.   If you have any questions please call:  [place] [phone]      SIGNS OF  LABOR    Labor is  if it happens more than three weeks before your due date.    It can be hard to know if you are in labor, since the symptoms can be like the normal feelings of pregnancy.  Often, the only difference is the symptoms increase or they don't go away.     Signs of  labor can include:    Change in your vaginal discharge:  You will have more vaginal discharge when you are pregnant and it should be creamy white.  Call the clinic right away if your discharge has a foul odor, pink or bloody,  or if it becomes watery or is more than  is normal for you during your pregnancy.    More than 5-6 contractions or tightenings per hour.  Contractions can feel like period cramps or bowel (gas or diarrhea) pain.  You will feel it in the lower part of your abdomen, in your back or as a pressure feeling in your bottom.  It is often regular, coming for 30 seconds or a minute and then going away, only to come back 5 or 10 minutes later. Some contractions are normal during pregnancy, but if you are feeling more than 5-6 in one hour, empty your bladder, then drink 16-24 ounces of water, eat a snack and lay down on your left side. Put your hand on your abdomen to count the contractions.  If after one hour of resting you have still had 5-6 contractions call your clinic.    ;

## 2017-05-23 NOTE — PROGRESS NOTES
Feeling well.  No complaints  Completed ultrasound at [facility].  No placenta previa.   Boy.  Will circ.  Peds is [facility].   No contr/LOF/VB  Reviewed GCT and PTL   RTC 4 weeks.  RAJANI Garcia, CNM

## 2017-05-23 NOTE — NURSING NOTE
"Chief Complaint   Patient presents with     Prenatal Care       Initial /50 (BP Location: Right arm, Patient Position: Chair, Cuff Size: Adult Regular)  Pulse 64  Wt 186 lb 8 oz (84.6 kg)  LMP 12/24/2016  BMI 31.52 kg/m2 Estimated body mass index is 31.52 kg/(m^2) as calculated from the following:    Height as of 3/1/17: 5' 4.5\" (1.638 m).    Weight as of this encounter: 186 lb 8 oz (84.6 kg).  Medication Reconciliation: complete   Kayla Oneal CMA      "

## 2017-05-26 ENCOUNTER — TELEPHONE (OUTPATIENT)
Dept: OBGYN | Facility: OTHER | Age: 37
End: 2017-05-26

## 2017-05-26 NOTE — LETTER
[place] [address] [facility] [address]  [phone]      May 30, 2017      Kassie Mayfield  [address] [facility] [address]                To Whom It May Concern:    RE:  Kassie Mayfield a current patient  at our clinic.  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    Please contact me with any questions or concerns at [phone].    Sincerely,         RAJANI Garcia CNM/do

## 2017-05-26 NOTE — TELEPHONE ENCOUNTER
Informed the patient that JKD is not in the office until Tuesday 05/30/2017. Patient is okay with waiting until this time for a letter. Akilah Stephen RN, BSN

## 2017-05-26 NOTE — LETTER
To Whom It May Concern:    RE:  Kassie Mayfield a current patient  at our clinic.  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    Please contact me with any questions or concerns at [phone].    Sincerely,         RAJANI Garcia CNM/do

## 2017-05-30 NOTE — TELEPHONE ENCOUNTER
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  Kayla Oneal CMA

## 2017-05-30 NOTE — TELEPHONE ENCOUNTER
Letter completed and signed by Noemi. Contacted patient so that she can  at [place] . [facility].  Kayla Oneal CMA

## 2017-06-01 ENCOUNTER — TELEPHONE (OUTPATIENT)
Dept: OBGYN | Facility: OTHER | Age: 37
End: 2017-06-01

## 2017-06-01 NOTE — LETTER
[address] [address]  [phone]          June 1, 2017              To Whom it may Concern    Kassie Mayfield has the following work restrictions. 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 No lifting of patients who have fallen even with assist. 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    Sincerely,        Noemi GERARD CNJORDAN/do

## 2017-06-01 NOTE — TELEPHONE ENCOUNTER
Reason for Call:  Other letter    Detailed comments: patient gave her work the letter that was written and they want more information    Phone Number Patient can be reached at: Home number on file [phone] (home) or Cell number on file:    No relevant phone numbers on file.       Best Time: anytime    Can we leave a detailed message on this number? YES    Call taken on 6/1/2017 at 8:13 AM by Cathie Dewitt

## 2017-06-01 NOTE — TELEPHONE ENCOUNTER
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

## 2017-06-01 NOTE — TELEPHONE ENCOUNTER
New letter created with more specific restrictions and job duties. Letter faxed to [phone] and copy placed at  for patient pickup. Notified patient that letter was faxed and is ready to be picked up at [place] .  Kayla Oneal CMA

## 2017-06-01 NOTE — TELEPHONE ENCOUNTER
Contacted patient to gather additional information.  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  The new letter can be faxed to [phone].  Kassie would also like to  a copy at the  later today.  Please contact when completed.

## 2017-06-19 ENCOUNTER — PRENATAL OFFICE VISIT (OUTPATIENT)
Dept: OBGYN | Facility: OTHER | Age: 37
End: 2017-06-19
Payer: COMMERCIAL

## 2017-06-19 VITALS
WEIGHT: 187 LBS | BODY MASS INDEX: 31.6 KG/M2 | HEART RATE: 80 BPM | SYSTOLIC BLOOD PRESSURE: 104 MMHG | DIASTOLIC BLOOD PRESSURE: 50 MMHG

## 2017-06-19 DIAGNOSIS — O09.529 SUPERVISION OF HIGH-RISK PREGNANCY OF ELDERLY MULTIGRAVIDA: ICD-10-CM

## 2017-06-19 LAB
GLUCOSE 1H P 50 G GLC PO SERPL-MCNC: 105 MG/DL (ref 60–129)
HGB BLD-MCNC: 12 G/DL (ref 11.7–15.7)

## 2017-06-19 PROCEDURE — 36415 COLL VENOUS BLD VENIPUNCTURE: CPT | Performed by: ADVANCED PRACTICE MIDWIFE

## 2017-06-19 PROCEDURE — 00000218 ZZHCL STATISTIC OBHBG - HEMOGLOBIN: Performed by: ADVANCED PRACTICE MIDWIFE

## 2017-06-19 PROCEDURE — 82950 GLUCOSE TEST: CPT | Performed by: ADVANCED PRACTICE MIDWIFE

## 2017-06-19 PROCEDURE — 99207 ZZC PRENATAL VISIT: CPT | Performed by: OBSTETRICS & GYNECOLOGY

## 2017-06-19 PROCEDURE — 86780 TREPONEMA PALLIDUM: CPT | Performed by: ADVANCED PRACTICE MIDWIFE

## 2017-06-19 ASSESSMENT — PAIN SCALES - GENERAL: PAINLEVEL: NO PAIN (0)

## 2017-06-19 NOTE — PROGRESS NOTES
37 year old  at 26w6d weeks presents to the clinic for a routine prenatal visit.  No concerns.  No vaginal bleeding, leakage of fluid, or contractions   Good fetal movement.  FHTs= 130's  Fundal height=28cm    Normal anatomy ultrasound with MFM  RTC 2 weeks  GGT today  Blood type:O+    [place]

## 2017-06-19 NOTE — NURSING NOTE
"Chief Complaint   Patient presents with     Prenatal Care     1 hr. GCT       Initial /50  Pulse 80  Wt 187 lb (84.8 kg)  LMP 12/24/2016  BMI 31.6 kg/m2 Estimated body mass index is 31.6 kg/(m^2) as calculated from the following:    Height as of 3/1/17: 5' 4.5\" (1.638 m).    Weight as of this encounter: 187 lb (84.8 kg).  Medication Reconciliation: complete     26w6d    "

## 2017-06-19 NOTE — MR AVS SNAPSHOT
"              After Visit Summary   6/19/2017    Kassie Mayfield    MRN: [id number]           Patient Information     Date Of Birth          1980        Visit Information        Provider Department      6/19/2017 8:45 AM Diamond Hill, DO [facility]        Today's Diagnoses     Supervision of high-risk pregnancy of elderly multigravida          Care Instructions    What to watch out for are: regular contractions every 5 min, vaginal bleeding, decreased fetal movement, or leakage of fluid.  Please call the office or go to L&D if you develop any of these signs and symptoms.      I will see you for your follow up appointment.  Please feel free to call if you have any questions or concerns.      Thanks,  Diamond Hill, DO            Follow-ups after your visit        Follow-up notes from your care team     Return in about 2 weeks (around 7/3/2017).      Who to contact     If you have questions or need follow up information about today's clinic visit or your schedule please contact [facility] directly at [phone].  Normal or non-critical lab and imaging results will be communicated to you by Orbit Min[employer] Limitedhart, letter or phone within 4 business days after the clinic has received the results. If you do not hear from us within 7 days, please contact the clinic through Or[employer] or phone. If you have a critical or abnormal lab result, we will notify you by phone as soon as possible.  Submit refill requests through Atlantia Search or call your pharmacy and they will forward the refill request to us. Please allow 3 business days for your refill to be completed.          Additional Information About Your Visit        Or[employer] Min[employer]hart Information     Atlantia Search lets you send messages to your doctor, view your test results, renew your prescriptions, schedule appointments and more. To sign up, go to [email or web address] . Click on \"Log in\" on the left side of the screen, which will take you to the Welcome " "page. Then click on \"Sign up Now\" on the right side of the page.     You will be asked to enter the access code listed below, as well as some personal information. Please follow the directions to create your username and password.     Your access code is: [email or web address]  Expires: 2017  8:09 AM     Your access code will  in 90 days. If you need help or a new code, please call your [place] clinic or [phone].        Care EveryWhere ID     This is your Care EveryWhere ID. This could be used by other organizations to access your [place] medical records  [id number]        Your Vitals Were     Pulse Last Period BMI (Body Mass Index)             80 2016 31.6 kg/m2          Blood Pressure from Last 3 Encounters:   17 104/50   17 108/50   17 100/52    Weight from Last 3 Encounters:   17 187 lb (84.8 kg)   17 186 lb 8 oz (84.6 kg)   17 183 lb 12 oz (83.3 kg)              We Performed the Following     Anti Treponema     Glucose tolerance gest screen 1 hour     OB hemoglobin        Primary Care Provider    None       No address on file        Thank you!     Thank you for choosing [facility]  for your care. Our goal is always to provide you with excellent care. Hearing back from our patients is one way we can continue to improve our services. Please take a few minutes to complete the written survey that you may receive in the mail after your visit with us. Thank you!             Your Updated Medication List - Protect others around you: Learn how to safely use, store and throw away your medicines at www.disposemymeds.org.          This list is accurate as of: 17  9:42 AM.  Always use your most recent med list.                   Brand Name Dispense Instructions for use    prenatal multivitamin  plus iron 27-0.8 MG Tabs per tablet      Take 1 tablet by mouth daily         "

## 2017-06-19 NOTE — PATIENT INSTRUCTIONS
What to watch out for are: regular contractions every 5 min, vaginal bleeding, decreased fetal movement, or leakage of fluid.  Please call the office or go to L&D if you develop any of these signs and symptoms.      I will see you for your follow up appointment.  Please feel free to call if you have any questions or concerns.      Thanks,  Diamond Hill, DO

## 2017-06-20 LAB — T PALLIDUM IGG+IGM SER QL: NEGATIVE

## 2017-06-30 ENCOUNTER — PRENATAL OFFICE VISIT (OUTPATIENT)
Dept: OBGYN | Facility: OTHER | Age: 37
End: 2017-06-30
Payer: COMMERCIAL

## 2017-06-30 VITALS
SYSTOLIC BLOOD PRESSURE: 100 MMHG | HEART RATE: 64 BPM | BODY MASS INDEX: 31.69 KG/M2 | DIASTOLIC BLOOD PRESSURE: 54 MMHG | WEIGHT: 187.5 LBS

## 2017-06-30 DIAGNOSIS — Z23 NEED FOR TDAP VACCINATION: ICD-10-CM

## 2017-06-30 DIAGNOSIS — O09.529 SUPERVISION OF HIGH-RISK PREGNANCY OF ELDERLY MULTIGRAVIDA: ICD-10-CM

## 2017-06-30 PROCEDURE — 90715 TDAP VACCINE 7 YRS/> IM: CPT | Performed by: ADVANCED PRACTICE MIDWIFE

## 2017-06-30 PROCEDURE — 90471 IMMUNIZATION ADMIN: CPT | Performed by: ADVANCED PRACTICE MIDWIFE

## 2017-06-30 PROCEDURE — 99207 ZZC PRENATAL VISIT: CPT | Performed by: ADVANCED PRACTICE MIDWIFE

## 2017-06-30 NOTE — NURSING NOTE
"Chief Complaint   Patient presents with     Prenatal Care     lower right side back pain       Initial /54 (BP Location: Left arm, Patient Position: Chair, Cuff Size: Adult Large)  Pulse 64  Wt 187 lb 8 oz (85 kg)  LMP 12/24/2016  BMI 31.69 kg/m2 Estimated body mass index is 31.69 kg/(m^2) as calculated from the following:    Height as of 3/1/17: 5' 4.5\" (1.638 m).    Weight as of this encounter: 187 lb 8 oz (85 kg).  Medication Reconciliation: complete   Kayla Oneal CMA      "

## 2017-06-30 NOTE — PATIENT INSTRUCTIONS
Thank for choosing our clinic for your health care needs. We aim to provided you with excellent care. One way that we continue to improve our care is by listening to our patients. Please take a few minutes to complete the written survey that you may receive in the mail after your visit.     You may reach your care team by calling the following numbers:    [place]- [phone]   For clinic hours at [facility]  please visit the [place] web site [email or web address]    Notification of your lab results:  Normal or non-critical lab and imaging results will be communicated to you by Mychart, letter, or phone within 7 days. If you do not hear from us within 10 days, please contact us through Mychart or phone. If you have a critical or abnormal lab result, we will notify you by phone as soon as possible.      After Hours nurse advice:  [facility]:  [phone]     Medication Refills:  Please contact your pharmacy and they will forward the refill to us. Please allow 3 business days for your refills to be completed.     Secure access to your medical record:  Use Silverback Systems (secure email communication and access to your chart) to send your primary care provider a message or make an appointment. Ask someone on your Team how to sign up for [employer] Systems. To log on to OVIVO Mobile Communications or for more information in Silverback Systems please visit the website at [email or web address].      OBGYN Care Team               Janis Keil Day [facility]   Clinic hours: Tuesday 8-5 , Thursday 1145-7 and every other Friday 8-5 at [place]   Wednesday 8-5 at Jose A Hill DO  Clinic hours 7-6 Monday at [place]  8-5 Tuesdays at [place]  7-12 Fridays [facility]  1-5 Fridays at [place]    Jeannette Uriarte MD  Clinic hours: [place] Monday and Thursday 8:15-5  [place] and Friday 8-5

## 2017-06-30 NOTE — MR AVS SNAPSHOT
After Visit Summary   6/30/2017    Kassie Mayfield    MRN: [id number]           Patient Information     Date Of Birth          1980        Visit Information        Provider Department      6/30/2017 8:45 AM Noemi Buitrago APRN CNM [facility]        Today's Diagnoses     Supervision of high-risk pregnancy of elderly multigravida        Need for Tdap vaccination          Care Instructions    Thank for choosing our clinic for your health care needs. We aim to provided you with excellent care. One way that we continue to improve our care is by listening to our patients. Please take a few minutes to complete the written survey that you may receive in the mail after your visit.     You may reach your care team by calling the following numbers:    [place]- [phone]   For clinic hours at [facility]  please visit the [place] web site [email or web address]    Notification of your lab results:  Normal or non-critical lab and imaging results will be communicated to you by Mychart, letter, or phone within 7 days. If you do not hear from us within 10 days, please contact us through ClevrU Corporationhart or phone. If you have a critical or abnormal lab result, we will notify you by phone as soon as possible.      After Hours nurse advice:  [facility]:  [phone]     Medication Refills:  Please contact your pharmacy and they will forward the refill to us. Please allow 3 business days for your refills to be completed.     Secure access to your medical record:  Use ItsPlatonict (secure email communication and access to your chart) to send your primary care provider a message or make an appointment. Ask someone on your Team how to sign up for NetBase Solutions. To log on to Business Engine or for more information in NetBase Solutions please visit the website at [email or web address].      OBGYN Care Team               Noemi Elder CNM   Clinic hours: Tuesday 8-5 , Thursday 1145-7 and every other Friday 8-5  "at [place]   Wednesday 8-5 at Jose A Hill DO  Clinic hours 7-6 Monday at [place]  8-5 Tuesdays at [place]  7-12 Fridays [facility]  1-5 Fridays at [place]    Jeannette Uriarte MD  Clinic hours: [place] Monday and Thursday 8:15-5  [place] and Friday 8-5                                    Follow-ups after your visit        Follow-up notes from your care team     Return in about 2 weeks (around 7/14/2017).      Your next 10 appointments already scheduled     Jul 11, 2017  8:30 AM CDT   ESTABLISHED PRENATAL with RAJANI Meyers [facility]    [address] [facility] [address] [phone]              Who to contact     If you have questions or need follow up information about today's clinic visit or your schedule please contact [facility] directly at [phone].  Normal or non-critical lab and imaging results will be communicated to you by ET Solar Grouphart, letter or phone within 4 business days after the clinic has received the results. If you do not hear from us within 7 days, please contact the clinic through [employer]ouphart or phone. If you have a critical or abnormal lab result, we will notify you by phone as soon as possible.  Submit refill requests through [employer] Display System or call your pharmacy and they will forward the refill request to us. Please allow 3 business days for your refill to be completed.          Additional Information About Your Visit        [employer] Display System Information     [employer] Display System lets you send messages to your doctor, view your test results, renew your prescriptions, schedule appointments and more. To sign up, go to [email or web address] . Click on \"Log in\" on the left side of the screen, which will take you to the Welcome page. Then click on \"Sign up Now\" on the right side of the page.     You will be asked to enter the access code listed below, as well as some personal information. Please follow the directions " to create your username and password.     Your access code is: [email or web address]  Expires: 2017  8:09 AM     Your access code will  in 90 days. If you need help or a new code, please call your [place] clinic or [phone].        Care EveryWhere ID     This is your Care EveryWhere ID. This could be used by other organizations to access your [place] medical records  [id number]        Your Vitals Were     Pulse Last Period BMI (Body Mass Index)             64 2016 31.69 kg/m2          Blood Pressure from Last 3 Encounters:   17 100/54   17 104/50   17 108/50    Weight from Last 3 Encounters:   17 187 lb 8 oz (85 kg)   17 187 lb (84.8 kg)   17 186 lb 8 oz (84.6 kg)              We Performed the Following     INJECTION INTRAMUSCULAR OR SUB-Q     TDAP VACCINE (ADACEL)        Primary Care Provider    None       No address on file        Equal Access to Services     [facility]: Hadii aad ku hadasho Soomaali, waaxda luqadaha, qaybta kaalmada adeegyada, waxay cassiin bhavik garcia . So [facility] [phone].    ATENCIÓN: Si habla español, tiene a puga disposición servicios gratuitos de asistencia lingüística. Llame al [phone].    We comply with applicable federal civil rights laws and [place] laws. We do not discriminate on the basis of race, color, national origin, age, disability sex, sexual orientation or gender identity.            Thank you!     Thank you for choosing [facility]  for your care. Our goal is always to provide you with excellent care. Hearing back from our patients is one way we can continue to improve our services. Please take a few minutes to complete the written survey that you may receive in the mail after your visit with us. Thank you!             Your Updated Medication List - Protect others around you: Learn how to safely use, store and throw away your medicines at www.disposemymeds.org.          This list is  accurate as of: 6/30/17  8:54 AM.  Always use your most recent med list.                   Brand Name Dispense Instructions for use Diagnosis    prenatal multivitamin  plus iron 27-0.8 MG Tabs per tablet      Take 1 tablet by mouth daily

## 2017-06-30 NOTE — PROGRESS NOTES
Feeling well.  No complaints.  No contr/LOF/VB  Some back pain but not consistent or overly bothersome  Discussed relief measures.   Probably condoms for birth control though they want no more children.  Not interested in tubal or hormonal methods.   Will circ and has [facility] for peds.   rtc 2 weeks.   Noemi Elder, RAJANI, CNM

## 2017-07-11 ENCOUNTER — TELEPHONE (OUTPATIENT)
Dept: OBGYN | Facility: OTHER | Age: 37
End: 2017-07-11

## 2017-07-11 ENCOUNTER — PRENATAL OFFICE VISIT (OUTPATIENT)
Dept: OBGYN | Facility: OTHER | Age: 37
End: 2017-07-11
Payer: COMMERCIAL

## 2017-07-11 VITALS
WEIGHT: 187 LBS | HEART RATE: 76 BPM | SYSTOLIC BLOOD PRESSURE: 100 MMHG | DIASTOLIC BLOOD PRESSURE: 54 MMHG | BODY MASS INDEX: 31.6 KG/M2

## 2017-07-11 DIAGNOSIS — O09.529 SUPERVISION OF HIGH-RISK PREGNANCY OF ELDERLY MULTIGRAVIDA: Primary | ICD-10-CM

## 2017-07-11 PROBLEM — Z23 NEED FOR TDAP VACCINATION: Status: RESOLVED | Noted: 2017-03-29 | Resolved: 2017-07-11

## 2017-07-11 PROCEDURE — 99207 ZZC PRENATAL VISIT: CPT | Performed by: ADVANCED PRACTICE MIDWIFE

## 2017-07-11 NOTE — NURSING NOTE
"Chief Complaint   Patient presents with     Prenatal Care       Initial /54 (BP Location: Right arm, Patient Position: Chair, Cuff Size: Adult Regular)  Pulse 76  Wt 187 lb (84.8 kg)  LMP 12/24/2016  BMI 31.6 kg/m2 Estimated body mass index is 31.6 kg/(m^2) as calculated from the following:    Height as of 3/1/17: 5' 4.5\" (1.638 m).    Weight as of this encounter: 187 lb (84.8 kg).  Medication Reconciliation: complete   Kayla Oneal CMA      "

## 2017-07-11 NOTE — MR AVS SNAPSHOT
After Visit Summary   7/11/2017    Kassie Mayfield    MRN: [id number]           Patient Information     Date Of Birth          1980        Visit Information        Provider Department      7/11/2017 8:30 AM Noemi Buitrago APRN CNM [facility]        Today's Diagnoses     Supervision of high-risk pregnancy of elderly multigravida    -  1       Follow-ups after your visit        Follow-up notes from your care team     Return in about 2 weeks (around 7/25/2017).      Your next 10 appointments already scheduled     Jul 25, 2017  8:30 AM CDT   ESTABLISHED PRENATAL with RAJANI Meyers CNM   [facility]    [address] [facility] [id number] [phone]            Aug 08, 2017  8:30 AM CDT   ESTABLISHED PRENATAL with RAJANI Meyers CNM   [facility]    [address] [facility] [id number] [phone]              Who to contact     If you have questions or need follow up information about today's clinic visit or your schedule please contact [facility] directly at [phone].  Normal or non-critical lab and imaging results will be communicated to you by MyChart, letter or phone within 4 business days after the clinic has received the results. If you do not hear from us within 7 days, please contact the clinic through Andean Designshart or phone. If you have a critical or abnormal lab result, we will notify you by phone as soon as possible.  Submit refill requests through The Industry's Alternative or call your pharmacy and they will forward the refill request to us. Please allow 3 business days for your refill to be completed.          Additional Information About Your Visit        MyChart Information     The Industry's Alternative lets you send messages to your doctor, view your test results, renew your prescriptions, schedule appointments and more. To sign up, go to [email or web address] . Click on  "\"Log in\" on the left side of the screen, which will take you to the Welcome page. Then click on \"Sign up Now\" on the right side of the page.     You will be asked to enter the access code listed below, as well as some personal information. Please follow the directions to create your username and password.     Your access code is: [email or web address]  Expires: 2017  8:09 AM     Your access code will  in 90 days. If you need help or a new code, please call your [place] clinic or [phone].        Care EveryWhere ID     This is your Care EveryWhere ID. This could be used by other organizations to access your [place] medical records  [id number]        Your Vitals Were     Pulse Last Period BMI (Body Mass Index)             76 2016 31.6 kg/m2          Blood Pressure from Last 3 Encounters:   17 100/54   17 100/54   17 104/50    Weight from Last 3 Encounters:   17 187 lb (84.8 kg)   17 187 lb 8 oz (85 kg)   17 187 lb (84.8 kg)              Today, you had the following     No orders found for display       Primary Care Provider    None       No address on file        Equal Access to Services     [facility] : Hadii arnulfo ku sophieo Sotomali, waaxda luqadaha, qaybta kaalmada adeegyada, waxay sabiha garcia . So [facility] [phone].    ATENCIÓN: Si habla español, tiene a puga disposición servicios gratuitos de asistencia lingüística. Llame al [phone].    We comply with applicable federal civil rights laws and [place] laws. We do not discriminate on the basis of race, color, national origin, age, disability sex, sexual orientation or gender identity.            Thank you!     Thank you for choosing [facility]  for your care. Our goal is always to provide you with excellent care. Hearing back from our patients is one way we can continue to improve our services. Please take a few minutes to complete the written survey that you may receive " in the mail after your visit with us. Thank you!             Your Updated Medication List - Protect others around you: Learn how to safely use, store and throw away your medicines at www.disposemymeds.org.          This list is accurate as of: 7/11/17  8:53 AM.  Always use your most recent med list.                   Brand Name Dispense Instructions for use Diagnosis    prenatal multivitamin  plus iron 27-0.8 MG Tabs per tablet      Take 1 tablet by mouth daily

## 2017-07-11 NOTE — TELEPHONE ENCOUNTER
Reason for call:  Form  Reason for Call:  Form, our goal is to have forms completed with 72 hours, however, some forms may require a visit or additional information.    Type of letter, form or note:  FMLA    Who is the form from?: Patient    Where did the form come from: Patient or family brought in       What clinic location was the form placed at?: [facility] - [phone]    Where the form was placed: Given to physician    What number is listed as a contact on the form?: [phone]         Additional comments: FMLA    Call taken on 7/11/2017 at 3:37 PM by Aleksandra Zimmerman

## 2017-07-11 NOTE — PROGRESS NOTES
Feeling ok  Some sciatic pain this week.  Thinks it was related to a busy day at work.   No contra/lof/vb  Has FMLA paper work.  Planning on stopping work at 36 weeks.  Understands that this will decrease her time post partum if she is using FMLA.  RTC 2 weeks.   Noemi Elder, APRN, CNM

## 2017-07-25 ENCOUNTER — PRENATAL OFFICE VISIT (OUTPATIENT)
Dept: OBGYN | Facility: OTHER | Age: 37
End: 2017-07-25
Payer: COMMERCIAL

## 2017-07-25 VITALS
HEART RATE: 72 BPM | WEIGHT: 186.5 LBS | BODY MASS INDEX: 31.52 KG/M2 | SYSTOLIC BLOOD PRESSURE: 100 MMHG | DIASTOLIC BLOOD PRESSURE: 50 MMHG

## 2017-07-25 DIAGNOSIS — O09.529 SUPERVISION OF HIGH-RISK PREGNANCY OF ELDERLY MULTIGRAVIDA: Primary | ICD-10-CM

## 2017-07-25 PROCEDURE — 99207 ZZC PRENATAL VISIT: CPT | Performed by: ADVANCED PRACTICE MIDWIFE

## 2017-07-25 NOTE — NURSING NOTE
"Chief Complaint   Patient presents with     Prenatal Care       Initial /50 (BP Location: Left arm, Patient Position: Chair, Cuff Size: Adult Large)  Pulse 72  Wt 186 lb 8 oz (84.6 kg)  LMP 12/24/2016  BMI 31.52 kg/m2 Estimated body mass index is 31.52 kg/(m^2) as calculated from the following:    Height as of 3/1/17: 5' 4.5\" (1.638 m).    Weight as of this encounter: 186 lb 8 oz (84.6 kg).  Medication Reconciliation: complete   Kayla Oneal CMA      "

## 2017-07-25 NOTE — PROGRESS NOTES
Feeling well.  No complaints.  Relates that she feels much better with this pregnancy and attributes it to working through the pregnancy.   Didn't gain a lot of weight with her first pregnancy either.  Denies n/v/d  No contra/LOF/VB  RTC 2 weeks.   Noemi Elder, APRN, CNM

## 2017-07-25 NOTE — MR AVS SNAPSHOT
"              After Visit Summary   7/25/2017    Kassie Mayfield    MRN: [id number]           Patient Information     Date Of Birth          1980        Visit Information        Provider Department      7/25/2017 8:30 AM Noemi Buitrago APRN CNM [facility]        Today's Diagnoses     Supervision of high-risk pregnancy of elderly multigravida    -  1       Follow-ups after your visit        Follow-up notes from your care team     Return in about 2 weeks (around 8/8/2017).      Your next 10 appointments already scheduled     Aug 08, 2017  8:30 AM CDT   ESTABLISHED PRENATAL with RAJANI Meyers CNM   [facility]    [address] [facility] [address] [phone]              Who to contact     If you have questions or need follow up information about today's clinic visit or your schedule please contact [facility] directly at [phone].  Normal or non-critical lab and imaging results will be communicated to you by The Epsilon Projecthart, letter or phone within 4 business days after the clinic has received the results. If you do not hear from us within 7 days, please contact the clinic through Natural Option USAt or phone. If you have a critical or abnormal lab result, we will notify you by phone as soon as possible.  Submit refill requests through Postdeck or call your pharmacy and they will forward the refill request to us. Please allow 3 business days for your refill to be completed.          Additional Information About Your Visit        The Epsilon Projecthart Information     Postdeck lets you send messages to your doctor, view your test results, renew your prescriptions, schedule appointments and more. To sign up, go to [email or web address] . Click on \"Log in\" on the left side of the screen, which will take you to the Welcome page. Then click on \"Sign up Now\" on the right side of the page.     You will be asked to enter the access code listed below, as " well as some personal information. Please follow the directions to create your username and password.     Your access code is: [email or web address]  Expires: 2017  8:09 AM     Your access code will  in 90 days. If you need help or a new code, please call your [place] clinic or [phone].        Care EveryWhere ID     This is your Care EveryWhere ID. This could be used by other organizations to access your [place] medical records  [id number]        Your Vitals Were     Pulse Last Period BMI (Body Mass Index)             72 2016 31.52 kg/m2          Blood Pressure from Last 3 Encounters:   17 100/50   17 100/54   17 100/54    Weight from Last 3 Encounters:   17 186 lb 8 oz (84.6 kg)   17 187 lb (84.8 kg)   17 187 lb 8 oz (85 kg)              Today, you had the following     No orders found for display       Primary Care Provider    None       No address on file        Equal Access to Services     [facility]: Hadii arnulfo ku sophieo Sogeraldine, waaxda luqadaha, qaybta kaalmada adeyudith, la garcia . So [facility] [phone].    ATENCIÓN: Si habla español, tiene a puga disposición servicios gratuitos de asistencia lingüística. Llame al [phone].    We comply with applicable federal civil rights laws and [place] laws. We do not discriminate on the basis of race, color, national origin, age, disability sex, sexual orientation or gender identity.            Thank you!     Thank you for choosing [facility]  for your care. Our goal is always to provide you with excellent care. Hearing back from our patients is one way we can continue to improve our services. Please take a few minutes to complete the written survey that you may receive in the mail after your visit with us. Thank you!             Your Updated Medication List - Protect others around you: Learn how to safely use, store and throw away your medicines at  www.disposemymeds.org.          This list is accurate as of: 7/25/17  9:35 AM.  Always use your most recent med list.                   Brand Name Dispense Instructions for use Diagnosis    prenatal multivitamin  plus iron 27-0.8 MG Tabs per tablet      Take 1 tablet by mouth daily

## 2017-08-08 ENCOUNTER — PRENATAL OFFICE VISIT (OUTPATIENT)
Dept: OBGYN | Facility: OTHER | Age: 37
End: 2017-08-08
Payer: COMMERCIAL

## 2017-08-08 VITALS
SYSTOLIC BLOOD PRESSURE: 97 MMHG | BODY MASS INDEX: 31.26 KG/M2 | HEART RATE: 91 BPM | WEIGHT: 185 LBS | DIASTOLIC BLOOD PRESSURE: 58 MMHG

## 2017-08-08 DIAGNOSIS — O09.529 SUPERVISION OF HIGH-RISK PREGNANCY OF ELDERLY MULTIGRAVIDA: Primary | ICD-10-CM

## 2017-08-08 PROCEDURE — 99207 ZZC PRENATAL VISIT: CPT | Performed by: ADVANCED PRACTICE MIDWIFE

## 2017-08-08 ASSESSMENT — PAIN SCALES - GENERAL: PAINLEVEL: NO PAIN (0)

## 2017-08-08 NOTE — MR AVS SNAPSHOT
"              After Visit Summary   2017    Kassie Mayfield    MRN: [id number]           Patient Information     Date Of Birth          1980        Visit Information        Provider Department      2017 8:30 AM Noemi Buitrago APRN CNM [facility]        Today's Diagnoses     Supervision of high-risk pregnancy of elderly multigravida    -  1       Follow-ups after your visit        Follow-up notes from your care team     Return in about 2 weeks (around 2017).      Who to contact     If you have questions or need follow up information about today's clinic visit or your schedule please contact [facility] directly at [phone].  Normal or non-critical lab and imaging results will be communicated to you by MyChart, letter or phone within 4 business days after the clinic has received the results. If you do not hear from us within 7 days, please contact the clinic through IndigoBoomhart or phone. If you have a critical or abnormal lab result, we will notify you by phone as soon as possible.  Submit refill requests through Tucoola or call your pharmacy and they will forward the refill request to us. Please allow 3 business days for your refill to be completed.          Additional Information About Your Visit        MyChart Information     Tucoola lets you send messages to your doctor, view your test results, renew your prescriptions, schedule appointments and more. To sign up, go to [email or web address] . Click on \"Log in\" on the left side of the screen, which will take you to the Welcome page. Then click on \"Sign up Now\" on the right side of the page.     You will be asked to enter the access code listed below, as well as some personal information. Please follow the directions to create your username and password.     Your access code is: [email or web address]  Expires: 2017  8:09 AM     Your access code will  in 90 days. If you need help or a new code, please call your " [facility] or [phone].        Care EveryWhere ID     This is your Care EveryWhere ID. This could be used by other organizations to access your [place] medical records  [id number]        Your Vitals Were     Pulse Last Period BMI (Body Mass Index)             91 12/24/2016 31.26 kg/m2          Blood Pressure from Last 3 Encounters:   08/08/17 97/58   07/25/17 100/50   07/11/17 100/54    Weight from Last 3 Encounters:   08/08/17 185 lb (83.9 kg)   07/25/17 186 lb 8 oz (84.6 kg)   07/11/17 187 lb (84.8 kg)              Today, you had the following     No orders found for display       Primary Care Provider    None       No address on file        Equal Access to Services     [facility] : Hadii arnulfo barrioso El, waaxda luqadaha, qaybta kaalmada adeegyamartina, la garcia . So [facility] [phone].    ATENCIÓN: Si habla español, tiene a puga disposición servicios gratuitos de asistencia lingüística. Llame al [phone].    We comply with applicable federal civil rights laws and [place] laws. We do not discriminate on the basis of race, color, national origin, age, disability sex, sexual orientation or gender identity.            Thank you!     Thank you for choosing [facility]  for your care. Our goal is always to provide you with excellent care. Hearing back from our patients is one way we can continue to improve our services. Please take a few minutes to complete the written survey that you may receive in the mail after your visit with us. Thank you!             Your Updated Medication List - Protect others around you: Learn how to safely use, store and throw away your medicines at www.disposemymeds.org.          This list is accurate as of: 8/8/17  8:54 AM.  Always use your most recent med list.                   Brand Name Dispense Instructions for use Diagnosis    prenatal multivitamin plus iron 27-0.8 MG Tabs per tablet      Take 1 tablet by mouth daily

## 2017-08-08 NOTE — PROGRESS NOTES
Feeling well. Had some left sided pain yesterday that she thinks was probably gas.   Has peds, will use condoms and BF.   Continues to deny n/v/d  No contra/LOF/VB  Plan GBS next visit. In two weeks  Noemi Elder, RAJANI, PRAKASHM

## 2017-08-22 ENCOUNTER — PRENATAL OFFICE VISIT (OUTPATIENT)
Dept: OBGYN | Facility: OTHER | Age: 37
End: 2017-08-22
Payer: COMMERCIAL

## 2017-08-22 VITALS
BODY MASS INDEX: 31.18 KG/M2 | HEART RATE: 68 BPM | WEIGHT: 184.5 LBS | SYSTOLIC BLOOD PRESSURE: 102 MMHG | DIASTOLIC BLOOD PRESSURE: 54 MMHG

## 2017-08-22 DIAGNOSIS — O09.529 SUPERVISION OF HIGH-RISK PREGNANCY OF ELDERLY MULTIGRAVIDA: Primary | ICD-10-CM

## 2017-08-22 PROCEDURE — 87653 STREP B DNA AMP PROBE: CPT | Performed by: ADVANCED PRACTICE MIDWIFE

## 2017-08-22 PROCEDURE — 99207 ZZC PRENATAL VISIT: CPT | Performed by: ADVANCED PRACTICE MIDWIFE

## 2017-08-22 NOTE — PROGRESS NOTES
Feeling well.  No complaints.   Works for another week.   GBS today.   Reports not having an appetite.   Labor instructions reviewed.   No contra/lof/vb    RTC 1 week,  Noemi Elder, RAJANI, CNM

## 2017-08-22 NOTE — NURSING NOTE
"Chief Complaint   Patient presents with     Prenatal Care       Initial /54 (BP Location: Left arm, Patient Position: Chair, Cuff Size: Adult Regular)  Pulse 68  Wt 184 lb 8 oz (83.7 kg)  LMP 12/24/2016  BMI 31.18 kg/m2 Estimated body mass index is 31.18 kg/(m^2) as calculated from the following:    Height as of 3/1/17: 5' 4.5\" (1.638 m).    Weight as of this encounter: 184 lb 8 oz (83.7 kg).  Medication Reconciliation: complete   Kayla Oneal CMA      "

## 2017-08-22 NOTE — MR AVS SNAPSHOT
"              After Visit Summary   2017    Kassie Mayfield    MRN: [id number]           Patient Information     Date Of Birth          1980        Visit Information        Provider Department      2017 8:30 AM Noemi Buitrago APRN CNM [facility]        Today's Diagnoses     Supervision of high-risk pregnancy of elderly multigravida    -  1       Follow-ups after your visit        Follow-up notes from your care team     Return in about 1 week (around 2017).      Who to contact     If you have questions or need follow up information about today's clinic visit or your schedule please contact [facility] directly at [phone].  Normal or non-critical lab and imaging results will be communicated to you by MyChart, letter or phone within 4 business days after the clinic has received the results. If you do not hear from us within 7 days, please contact the clinic through Maginaticshart or phone. If you have a critical or abnormal lab result, we will notify you by phone as soon as possible.  Submit refill requests through Mozilla or call your pharmacy and they will forward the refill request to us. Please allow 3 business days for your refill to be completed.          Additional Information About Your Visit        MyChart Information     Mozilla lets you send messages to your doctor, view your test results, renew your prescriptions, schedule appointments and more. To sign up, go to [email or web address] . Click on \"Log in\" on the left side of the screen, which will take you to the Welcome page. Then click on \"Sign up Now\" on the right side of the page.     You will be asked to enter the access code listed below, as well as some personal information. Please follow the directions to create your username and password.     Your access code is: [email or web address]  Expires: 2017 11:00 AM     Your access code will  in 90 days. If you need help or a new code, please call " your [place] clinic or [phone].        Care EveryWhere ID     This is your Care EveryWhere ID. This could be used by other organizations to access your [place] medical records  [id number]        Your Vitals Were     Pulse Last Period BMI (Body Mass Index)             68 12/24/2016 31.18 kg/m2          Blood Pressure from Last 3 Encounters:   08/22/17 102/54   08/08/17 97/58   07/25/17 100/50    Weight from Last 3 Encounters:   08/22/17 184 lb 8 oz (83.7 kg)   08/08/17 185 lb (83.9 kg)   07/25/17 186 lb 8 oz (84.6 kg)              We Performed the Following     Strep, Group B by PCR        Primary Care Provider    None       No address on file        Equal Access to Services     [facility] : Tatiana Gallegos, waaxda luqadaha, qaybta kaalmada desmond, la garcia . So [facility] [phone].    ATENCIÓN: Si habla español, tiene a puga disposición servicios gratuitos de asistencia lingüística. Llame al [phone].    We comply with applicable federal civil rights laws and [place] laws. We do not discriminate on the basis of race, color, national origin, age, disability sex, sexual orientation or gender identity.            Thank you!     Thank you for choosing [facility]  for your care. Our goal is always to provide you with excellent care. Hearing back from our patients is one way we can continue to improve our services. Please take a few minutes to complete the written survey that you may receive in the mail after your visit with us. Thank you!             Your Updated Medication List - Protect others around you: Learn how to safely use, store and throw away your medicines at www.disposemymeds.org.          This list is accurate as of: 8/22/17 11:00 AM.  Always use your most recent med list.                   Brand Name Dispense Instructions for use Diagnosis    prenatal multivitamin plus iron 27-0.8 MG Tabs per tablet      Take 1 tablet by mouth  daily

## 2017-08-23 LAB
GP B STREP DNA SPEC QL NAA+PROBE: NEGATIVE
SPECIMEN SOURCE: NORMAL

## 2017-08-29 ENCOUNTER — PRENATAL OFFICE VISIT (OUTPATIENT)
Dept: OBGYN | Facility: OTHER | Age: 37
End: 2017-08-29
Payer: COMMERCIAL

## 2017-08-29 VITALS
SYSTOLIC BLOOD PRESSURE: 100 MMHG | DIASTOLIC BLOOD PRESSURE: 52 MMHG | HEART RATE: 68 BPM | WEIGHT: 185.5 LBS | BODY MASS INDEX: 31.35 KG/M2

## 2017-08-29 DIAGNOSIS — O09.529 SUPERVISION OF HIGH-RISK PREGNANCY OF ELDERLY MULTIGRAVIDA: Primary | ICD-10-CM

## 2017-08-29 PROCEDURE — 99207 ZZC PRENATAL VISIT: CPT | Performed by: ADVANCED PRACTICE MIDWIFE

## 2017-08-29 NOTE — PROGRESS NOTES
Feeling well.  Tired.  Just got off work   No contra. LOF.VB  Reviewed labor instructions  Provided with phone numbers.   GBS reviewed.     RTC 1 week,\  Noemi Elder, RAJANI, PRAKASHM

## 2017-08-29 NOTE — MR AVS SNAPSHOT
"              After Visit Summary   2017    Kassie Mayfield    MRN: [id number]           Patient Information     Date Of Birth          1980        Visit Information        Provider Department      2017 8:00 AM Noemi Buitrago APRN CNM [facility]        Today's Diagnoses     Supervision of high-risk pregnancy of elderly multigravida    -  1       Follow-ups after your visit        Who to contact     If you have questions or need follow up information about today's clinic visit or your schedule please contact [facility] directly at [phone].  Normal or non-critical lab and imaging results will be communicated to you by Vanilla Forumshart, letter or phone within 4 business days after the clinic has received the results. If you do not hear from us within 7 days, please contact the clinic through Vanilla Forumshart or phone. If you have a critical or abnormal lab result, we will notify you by phone as soon as possible.  Submit refill requests through Epocrates or call your pharmacy and they will forward the refill request to us. Please allow 3 business days for your refill to be completed.          Additional Information About Your Visit        MyChart Information     Epocrates lets you send messages to your doctor, view your test results, renew your prescriptions, schedule appointments and more. To sign up, go to [email or web address] . Click on \"Log in\" on the left side of the screen, which will take you to the Welcome page. Then click on \"Sign up Now\" on the right side of the page.     You will be asked to enter the access code listed below, as well as some personal information. Please follow the directions to create your username and password.     Your access code is: [email or web address]  Expires: 2017 11:00 AM     Your access code will  in 90 days. If you need help or a new code, please call your [facility] or [phone].        Care EveryWhere ID     This is your Care " EveryWhere ID. This could be used by other organizations to access your [place] medical records  [id number]        Your Vitals Were     Pulse Last Period BMI (Body Mass Index)             68 12/24/2016 31.35 kg/m2          Blood Pressure from Last 3 Encounters:   08/29/17 100/52   08/22/17 102/54   08/08/17 97/58    Weight from Last 3 Encounters:   08/29/17 185 lb 8 oz (84.1 kg)   08/22/17 184 lb 8 oz (83.7 kg)   08/08/17 185 lb (83.9 kg)              Today, you had the following     No orders found for display       Primary Care Provider    None       No address on file        Equal Access to Services     [facility] : Tatiana Gallegos, alysa aguirre, anaid logan, la garcia . So [facility] [phone].    ATENCIÓN: Si habla español, tiene a puga disposición servicios gratuitos de asistencia lingüística. Llame al [phone].    We comply with applicable federal civil rights laws and [place] laws. We do not discriminate on the basis of race, color, national origin, age, disability sex, sexual orientation or gender identity.            Thank you!     Thank you for choosing [facility]  for your care. Our goal is always to provide you with excellent care. Hearing back from our patients is one way we can continue to improve our services. Please take a few minutes to complete the written survey that you may receive in the mail after your visit with us. Thank you!             Your Updated Medication List - Protect others around you: Learn how to safely use, store and throw away your medicines at www.disposemymeds.org.          This list is accurate as of: 8/29/17  8:08 AM.  Always use your most recent med list.                   Brand Name Dispense Instructions for use Diagnosis    prenatal multivitamin plus iron 27-0.8 MG Tabs per tablet      Take 1 tablet by mouth daily

## 2017-08-29 NOTE — NURSING NOTE
"Chief Complaint   Patient presents with     Prenatal Care       Initial /52 (BP Location: Right arm, Patient Position: Chair, Cuff Size: Adult Large)  Pulse 68  Wt 185 lb 8 oz (84.1 kg)  LMP 12/24/2016  BMI 31.35 kg/m2 Estimated body mass index is 31.35 kg/(m^2) as calculated from the following:    Height as of 3/1/17: 5' 4.5\" (1.638 m).    Weight as of this encounter: 185 lb 8 oz (84.1 kg).  Medication Reconciliation: complete   Kayla Oneal CMA      "

## 2017-09-08 ENCOUNTER — TELEPHONE (OUTPATIENT)
Dept: OBGYN | Facility: OTHER | Age: 37
End: 2017-09-08

## 2017-09-08 NOTE — TELEPHONE ENCOUNTER
Reason for Call:  Form, our goal is to have forms completed with 72 hours, however, some forms may require a visit or additional information.    Type of letter, form or note:  medical    Who is the form from?: Patient    Where did the form come from: Patient or family brought in       What clinic location was the form placed at?: [facility] - [phone]    Where the form was placed: [facility]    What number is listed as a contact on the form?: [phone]       Additional comments: none    Call taken on 9/8/2017 at 1:47 PM by Alma Rosa Grimes

## 2017-10-17 ENCOUNTER — RESULT FOLLOW UP (OUTPATIENT)
Dept: OBGYN | Facility: OTHER | Age: 37
End: 2017-10-17

## 2017-10-17 ENCOUNTER — OFFICE VISIT (OUTPATIENT)
Dept: OBGYN | Facility: OTHER | Age: 37
End: 2017-10-17
Payer: COMMERCIAL

## 2017-10-17 VITALS
DIASTOLIC BLOOD PRESSURE: 60 MMHG | BODY MASS INDEX: 29.11 KG/M2 | WEIGHT: 172.25 LBS | HEART RATE: 64 BPM | SYSTOLIC BLOOD PRESSURE: 104 MMHG

## 2017-10-17 DIAGNOSIS — Z12.4 SCREENING FOR MALIGNANT NEOPLASM OF CERVIX: ICD-10-CM

## 2017-10-17 DIAGNOSIS — R87.810 CERVICAL HIGH RISK HPV (HUMAN PAPILLOMAVIRUS) TEST POSITIVE: ICD-10-CM

## 2017-10-17 PROBLEM — O09.529 SUPERVISION OF HIGH-RISK PREGNANCY OF ELDERLY MULTIGRAVIDA: Status: RESOLVED | Noted: 2017-03-29 | Resolved: 2017-10-17

## 2017-10-17 PROCEDURE — G0145 SCR C/V CYTO,THINLAYER,RESCR: HCPCS | Performed by: ADVANCED PRACTICE MIDWIFE

## 2017-10-17 PROCEDURE — 87624 HPV HI-RISK TYP POOLED RSLT: CPT | Performed by: ADVANCED PRACTICE MIDWIFE

## 2017-10-17 PROCEDURE — 99207 ZZC POST PARTUM EXAM: CPT | Performed by: ADVANCED PRACTICE MIDWIFE

## 2017-10-17 ASSESSMENT — PATIENT HEALTH QUESTIONNAIRE - PHQ9: SUM OF ALL RESPONSES TO PHQ QUESTIONS 1-9: 1

## 2017-10-17 NOTE — PROGRESS NOTES
SUBJECTIVE: Kassie Mayfield  is here for a 6-week postpartum checkup.    Patient Active Problem List   Diagnosis     Sickle cell trait (H)     Supervision of high-risk pregnancy of elderly multigravida     Past Medical History:   Diagnosis Date     Sickle cell trait (H) 3/1/2017    FOB is negative via care everywhere     Current Outpatient Prescriptions   Medication Sig Dispense Refill     Prenatal Vit-Fe Fumarate-FA (PRENATAL MULTIVITAMIN  PLUS IRON) 27-0.8 MG TABS per tablet Take 1 tablet by mouth daily       ROS:  Delivery date was 9/3/2017. She had a  of a viable boy, weight 6 pounds 9 oz., with no complications.   She is  breast feeding without problems  and no signs of mastitis. Bleeding  has stopped and there are no signs or symptoms of endometritis.  Patient  is screened for postpartum depression and is  negative. Last PHQ-9 score on record= 1.   Denies stress incontinence.  No constipation, pain or bleeding with bowel movements.       EXAM:  Neck:  Euthyroid without masses or nodules.  Heart:  RRR without murmur.  Lungs: Clear to auscultation.  Abd: Without masses or tenderness, no organomegaly, diastisis 1 fingerbreadth.  Uterus not palpable  Pelvic:  BUS negative, perineum  well healed, vagina with minimal rugae, cervix closed.  Rectum:  Deferred      ASSESSMENT:   Normal postpartum exam.    PLAN:  (Z39.2) Routine postpartum follow-up  (primary encounter diagnosis)  Comment:   Plan: Pap imaged thin layer screen with HPV -         recommended age 30 - 65 years (select HPV order        below)            (Z12.4) Screening for malignant neoplasm of cervix  Comment:   Plan: HPV High Risk Types DNA Cervical            Encouraged  50 Kegals a day 25 fast and 25 slow release.   Signs, symptoms and preventative measures for mastitis were discussed and the patient will call with aches, fever of 101 or higher and flu like symptoms.  Signs and symptoms of post partum depression were discussed and she will contact  this office or other health care provider as appropriate.  Contraception is condoms

## 2017-10-17 NOTE — MR AVS SNAPSHOT
"              After Visit Summary   10/17/2017    Kassie Mayfield    MRN: [id number]           Patient Information     Date Of Birth          1980        Visit Information        Provider Department      10/17/2017 10:00 AM Noemi Buitrago APRN CNM [facility]        Today's Diagnoses     Routine postpartum follow-up    -  1    Screening for malignant neoplasm of cervix           Follow-ups after your visit        Who to contact     If you have questions or need follow up information about today's clinic visit or your schedule please contact [facility] directly at [phone].  Normal or non-critical lab and imaging results will be communicated to you by [employer], letter or phone within 4 business days after the clinic has received the results. If you do not hear from us within 7 days, please contact the clinic through [employer] or phone. If you have a critical or abnormal lab result, we will notify you by phone as soon as possible.  Submit refill requests through [employer] or call your pharmacy and they will forward the refill request to us. Please allow 3 business days for your refill to be completed.          Additional Information About Your Visit        MyChart Information     [employer] lets you send messages to your doctor, view your test results, renew your prescriptions, schedule appointments and more. To sign up, go to [email or web address] . Click on \"Log in\" on the left side of the screen, which will take you to the Welcome page. Then click on \"Sign up Now\" on the right side of the page.     You will be asked to enter the access code listed below, as well as some personal information. Please follow the directions to create your username and password.     Your access code is: [email or web address]  Expires: 2017 11:00 AM     Your access code will  in 90 days. If you need help or a new code, please call your [facility] or [phone].        Care EveryWhere ID     " This is your Care EveryWhere ID. This could be used by other organizations to access your [place] medical records  [id number]        Your Vitals Were     Pulse Breastfeeding? BMI (Body Mass Index)             64 Yes 29.11 kg/m2          Blood Pressure from Last 3 Encounters:   10/17/17 104/60   08/29/17 100/52   08/22/17 102/54    Weight from Last 3 Encounters:   10/17/17 172 lb 4 oz (78.1 kg)   08/29/17 185 lb 8 oz (84.1 kg)   08/22/17 184 lb 8 oz (83.7 kg)              We Performed the Following     HPV High Risk Types DNA Cervical     Pap imaged thin layer screen with HPV - recommended age 30 - 65 years (select HPV order below)        Primary Care Provider    None Specified       No primary provider on file.        Equal Access to Services     COLTEN ADAMS : Tatiana Gallegos, alysa aguirre, anaid logan, la garcia . So [facility] [phone].    ATENCIÓN: Si habla español, tiene a puga disposición servicios gratuitos de asistencia lingüística. Llame al [phone].    We comply with applicable federal civil rights laws and [place] laws. We do not discriminate on the basis of race, color, national origin, age, disability, sex, sexual orientation, or gender identity.            Thank you!     Thank you for choosing [facility]  for your care. Our goal is always to provide you with excellent care. Hearing back from our patients is one way we can continue to improve our services. Please take a few minutes to complete the written survey that you may receive in the mail after your visit with us. Thank you!             Your Updated Medication List - Protect others around you: Learn how to safely use, store and throw away your medicines at www.disposemymeds.org.          This list is accurate as of: 10/17/17 10:39 AM.  Always use your most recent med list.                   Brand Name Dispense Instructions for use Diagnosis    prenatal multivitamin  plus iron 27-0.8 MG Tabs per tablet      Take 1 tablet by mouth daily

## 2017-10-17 NOTE — LETTER
October 4, 2018      Kassie Chaparro  [address] [place] [facility] [address]    Dear ,      At [facility], your health and wellness is our primary concern. That is why we are following up on a positive high risk HPV test from 10/17/17. Your provider had recommended that you have a Pap smear and HPV test completed by 10/17/18. Our records do not show that this has been scheduled.    It is important to complete the follow up that your provider has suggested for you to ensure that there are no worsening changes which may, over time, develop into cancer.      Please contact our office at  [phone] to schedule an appointment for a Pap smear and HPV test at your earliest convenience. If you have questions or concerns, please call the clinic and we will be happy to assist you.    If you have completed the tests outside of [facility], please have the results forwarded to our office. We will update the chart for your primary Physician to review before your next annual physical.     Thank you for choosing [facility]!    Sincerely,      RAJANI Stone CNM/monika

## 2017-10-17 NOTE — NURSING NOTE
"Chief Complaint   Patient presents with     Post Partum Exam       Initial /60 (BP Location: Right arm, Patient Position: Chair, Cuff Size: Adult Large)  Pulse 64  Wt 172 lb 4 oz (78.1 kg)  Breastfeeding? Yes  BMI 29.11 kg/m2 Estimated body mass index is 29.11 kg/(m^2) as calculated from the following:    Height as of 3/1/17: 5' 4.5\" (1.638 m).    Weight as of this encounter: 172 lb 4 oz (78.1 kg).  Medication Reconciliation: complete   Kayla Oneal CMA      "

## 2017-10-17 NOTE — LETTER
October 24, 2017      Kassie Mayfield  [address] [place] [address]    Dear ,      This letter is in regards to the PAP smear and HPV (Human Papillomavirus) test you had done recently. Your PAP test result is normal, but your HPV (Human Papillomavirus) test was positive.     About 80 percent of women have been exposed to HPV virus throughout their lifetime. There is no medication for the treatment of HPV. Typically your own immune system gets rid of the virus before it does harm. HPV is spread by direct skin-to-skin contact, including sexual intercourse, oral sex, anal sex, or any other contact involving the genital area (example: hand to genital contact). It is not possible to become infected with HPV by touching an object, such as a toilet seat. Most people who are infected with HPV have no signs or symptoms.    Things that you can do to boost your immune system and help your body get rid of HPV: get plenty of rest, eat a well-balanced diet of healthy foods, and stop smoking.     Please return in 1 year to repeat your pap smear and HPV test.     If you have additional questions regarding this result, please call [phone].    Sincerely,      RAJANI Stone CNM/monika

## 2017-10-20 LAB
COPATH REPORT: NORMAL
PAP: NORMAL

## 2017-10-24 PROBLEM — R87.810 CERVICAL HIGH RISK HPV (HUMAN PAPILLOMAVIRUS) TEST POSITIVE: Status: ACTIVE | Noted: 2017-10-17

## 2017-10-24 LAB
FINAL DIAGNOSIS: ABNORMAL
HPV HR 12 DNA CVX QL NAA+PROBE: POSITIVE
HPV16 DNA SPEC QL NAA+PROBE: NEGATIVE
HPV18 DNA SPEC QL NAA+PROBE: NEGATIVE
SPECIMEN DESCRIPTION: ABNORMAL

## 2017-10-24 NOTE — PROGRESS NOTES
2014 NIL pap in Care Everywhere.  10/17/17 NIL pap, + HR HPV (not 16 or 18). Plan: cotest in 1 year.  10/4/18 Cotest reminder letter sent (rlm)  10/25/18 Called and spoke to patient who stated she has transferred care outside of [place].  End Tracking (rlm)

## 2018-10-16 ENCOUNTER — TELEPHONE (OUTPATIENT)
Dept: OBGYN | Facility: OTHER | Age: 38
End: 2018-10-16

## 2018-10-16 NOTE — TELEPHONE ENCOUNTER
Reason for Call:  Other call back    Detailed comments: pt calling, she received a letter from [facility] informing her to schedule an appt for a follow up pap. She is calling to let you know that she has switched clincs and now goes to  [facility]. She had this done there and is wondering if [facility] wants those results. Please advise.     Phone Number Patient can be reached at: Home number on file [phone] (home)    Best Time: any     Can we leave a detailed message on this number? YES    Call taken on 10/16/2018 at 9:24 AM by Courtney Hubbard

## 2018-10-16 NOTE — TELEPHONE ENCOUNTER
I called pt back and she said that she has switched to [facility] because her insurance changed. She had a NIL pap/+ HR HPV (not 16/18) last year done here at . She said she had another test done at [facility] in July of this year and that they told her to come back for another test in 3 years. She said she thought the pap was normal but then did spell out ASCUS to me for what she thought was the HPV result? It was very difficult to understand what she was telling me because there was a language barrier. I could not decipher if she even had an HPV test done at [facility].     I explained to pt that if she did in fact have an ASCUS pap this year, the recommendation would be to have another test called a colposcopy. I asked her if she told [facility] that her test was abnormal last year and it sounded like she did not tell them that , but again, I was having a very hard time understanding what she was trying to say.    I explained to pt that the best thing to do is to have her records sent here so we can review what her results were in July so that we make sure she gets the proper follow up. I also told her that she should have her FV results sent to [facility] as well if she is switching clinics.     She is going to contact [facility] today to get her records sent here for us to review. We will wait to get these records and review the records to see if the pt needs to have a colposcopy now.    Dinh Gunter, BSN, RN   Pap Tracking Nurse